# Patient Record
Sex: MALE | Race: WHITE
[De-identification: names, ages, dates, MRNs, and addresses within clinical notes are randomized per-mention and may not be internally consistent; named-entity substitution may affect disease eponyms.]

---

## 2017-01-13 ENCOUNTER — HOSPITAL ENCOUNTER (EMERGENCY)
Dept: HOSPITAL 75 - ER | Age: 52
Discharge: HOME | End: 2017-01-13
Payer: COMMERCIAL

## 2017-01-13 VITALS — DIASTOLIC BLOOD PRESSURE: 89 MMHG | SYSTOLIC BLOOD PRESSURE: 154 MMHG

## 2017-01-13 VITALS — WEIGHT: 235 LBS | BODY MASS INDEX: 34.8 KG/M2 | HEIGHT: 69 IN

## 2017-01-13 DIAGNOSIS — Z79.84: ICD-10-CM

## 2017-01-13 DIAGNOSIS — G89.29: ICD-10-CM

## 2017-01-13 DIAGNOSIS — M25.551: ICD-10-CM

## 2017-01-13 DIAGNOSIS — R10.31: Primary | ICD-10-CM

## 2017-01-13 DIAGNOSIS — E11.9: ICD-10-CM

## 2017-01-13 DIAGNOSIS — F17.210: ICD-10-CM

## 2017-01-13 LAB
ALBUMIN SERPL-MCNC: 3.8 G/DL (ref 3.2–4.5)
ALT SERPL-CCNC: 22 U/L (ref 0–55)
ANION GAP SERPL CALC-SCNC: 11 MMOL/L (ref 5–14)
AST SERPL-CCNC: 13 U/L (ref 5–34)
BASOPHILS # BLD AUTO: 0.1 10^3/UL (ref 0–0.1)
BASOPHILS NFR BLD AUTO: 1 % (ref 0–10)
BILIRUB SERPL-MCNC: 0.3 MG/DL (ref 0.1–1)
BILIRUB UR QL STRIP: NEGATIVE
BUN SERPL-MCNC: 13 MG/DL (ref 7–18)
BUN/CREAT SERPL: 16
CALCIUM SERPL-MCNC: 9.2 MG/DL (ref 8.5–10.1)
CHLORIDE SERPL-SCNC: 100 MMOL/L (ref 98–107)
CO2 SERPL-SCNC: 23 MMOL/L (ref 21–32)
CREAT SERPL-MCNC: 0.82 MG/DL (ref 0.6–1.3)
EOSINOPHIL # BLD AUTO: 0.3 10^3/UL (ref 0–0.3)
EOSINOPHIL NFR BLD AUTO: 2 % (ref 0–10)
ERYTHROCYTE [DISTWIDTH] IN BLOOD BY AUTOMATED COUNT: 11.9 % (ref 10–14.5)
GFR SERPLBLD BASED ON 1.73 SQ M-ARVRAT: > 60 ML/MIN
GLUCOSE SERPL-MCNC: 233 MG/DL (ref 70–105)
KETONES UR QL STRIP: NEGATIVE
LEUKOCYTE ESTERASE UR QL STRIP: NEGATIVE
LYMPHOCYTES # BLD AUTO: 3.1 X 10^3 (ref 1–4)
LYMPHOCYTES NFR BLD AUTO: 27 % (ref 12–44)
MCH RBC QN AUTO: 30 PG (ref 25–34)
MCHC RBC AUTO-ENTMCNC: 35 G/DL (ref 32–36)
MCV RBC AUTO: 88 FL (ref 80–99)
MONOCYTES # BLD AUTO: 0.8 X 10^3 (ref 0–1)
MONOCYTES NFR BLD AUTO: 7 % (ref 0–12)
NEUTROPHILS # BLD AUTO: 7.4 X 10^3 (ref 1.8–7.8)
NEUTROPHILS NFR BLD AUTO: 64 % (ref 42–75)
NITRITE UR QL STRIP: NEGATIVE
PH UR STRIP: 5 [PH] (ref 5–9)
PLATELET # BLD: 278 10^3/UL (ref 130–400)
PMV BLD AUTO: 10.4 FL (ref 7.4–10.4)
POTASSIUM SERPL-SCNC: 4.4 MMOL/L (ref 3.6–5)
PROT SERPL-MCNC: 6.9 G/DL (ref 6.4–8.2)
PROT UR QL STRIP: (no result)
RBC # BLD AUTO: 4.56 10^6/UL (ref 4.35–5.85)
SODIUM SERPL-SCNC: 134 MMOL/L (ref 135–145)
SP GR UR STRIP: 1.01 (ref 1.02–1.02)
SQUAMOUS #/AREA URNS HPF: (no result) /HPF
UROBILINOGEN UR-MCNC: NORMAL MG/DL
WBC # BLD AUTO: 11.6 10^3/UL (ref 4.3–11)
WBC #/AREA URNS HPF: (no result) /HPF

## 2017-01-13 PROCEDURE — 36415 COLL VENOUS BLD VENIPUNCTURE: CPT

## 2017-01-13 PROCEDURE — 74000: CPT

## 2017-01-13 PROCEDURE — 81000 URINALYSIS NONAUTO W/SCOPE: CPT

## 2017-01-13 PROCEDURE — 80053 COMPREHEN METABOLIC PANEL: CPT

## 2017-01-13 PROCEDURE — 85025 COMPLETE CBC W/AUTO DIFF WBC: CPT

## 2017-01-13 NOTE — DIAGNOSTIC IMAGING REPORT
INDICATION: Abdominal pain.



KUB obtained at 10:22 a.m.



The abdominal bowel gas pattern is unremarkable. There is no

sign of obstruction or ileus. There are multiple pelvic

calcifications which may be phleboliths although stones are

difficult to exclude. There is intramedullary queenie and screw in

the right hip with osteophyte formation of the adjacent iliac

bone.



IMPRESSION:



Unremarkable bowel gas pattern. No sign of obstruction or ileus.

There are multiple pelvic calcifications which could be

phleboliths or stones.



Dictated by:



Dictated on workstation # [id number]

## 2017-01-13 NOTE — ED GENERAL
General


Chief Complaint:  General Problems/Pain


Stated Complaint:  PELVIC PAIN


Nursing Triage Note:  


AMBULATED TO ROOM 06 WITH COMPLAINTS OF RIGHT LOWER BACK PAIN THAT RADIATES TO 


RLQ FOR 6 MONTHS.  STATES HE HAS A DISPLACED YANCY IN HIP AND LEG AREA THAT IS 

NON 


SURGICAL.  PT HAS TRAMADOL BUT DID NOT TAKE IT TODAY.


Nursing Sepsis Screen:  No Definite Risk


Source of Information:  Patient


Exam Limitations:  No Limitations





History of Present Illness


Time Seen by Provider:  09:25


Initial Comments


The patient is a 51-year-old white male who complains of right hip, low back, 

and now radiating across the pelvic area.  This is been present for perhaps as 

long as 6 months.  He takes tramadol for this although he did not do so today.  

He was here in November and had a CT scan which was negative.  His hip pain is 

of long standing.  He reports that he had a femur fracture with an intra-

medullary yancy.  The trochanteric component for fixation has apparently slipped.

  He has been told that this is not remediable.





Allergies and Home Medications


Allergies


Uncoded Allergies:  


     THC (Allergy, Unknown, 1/13/17)





Home Medications


Atorvastatin Calcium 40 Mg Tablet #90 (Reported) 


Liraglutide 0.6 Mg/0.1 Ml Pen.injctr #9 (Reported) 


Pregabalin 75 Mg Capsule #120 (Reported) 


Tramadol HCl 50 Mg Tablet #112 (Reported) 





Constitutional:   see HPI


EENTM:   no symptoms reported


Respiratory:   no symptoms reported


Cardiovascular:   no symptoms reported


Gastrointestinal:   constipation


Genitourinary:   no symptoms reported


Musculoskeletal:   see HPI


Skin:   no symptoms reported


Psychiatric/Neurological:   No Symptoms Reported


Hematologic/Lymphatic:   No Symptoms Reported


Immunological/Allergic:   no symptoms reported





Past Medical-Social-Family Hx


Patient Social History


Alcohol Use:  Occasionally Uses


Recreational Drug Use:  No


Smoking Status:  Current Everyday Smoker


Recent Foreign Travel:  No


Contact w/Someone Who Travel:  No


Recent Infectious Disease Expo:  No


Recent Hopitalizations:  No


Physical Abuse Screen:  No


Sexual Abuse:  No





Immunizations Up To Date


Tetanus Booster (TDap):  Less than 5yrs





Surgeries


HX Surgeries:  Yes


Surgeries:  Orthopedic





Respiratory


Hx Respiratory Disorders:  No





Cardiovascular


Hx Cardiac Disorders:  Yes


Cardiac Disorders:  High Cholesterol





Neurological


Hx Neurological Disorders:  No





Genitourinary


Hx Genitourinary Disorders:  No





Gastrointestinal


Hx Gastrointestinal Disorders:  No





Musculoskeletal


Hx Musculoskeletal Disorders:  Yes


Musculoskeletal Disorders:  Chronic Back Pain





Endocrine


Hx Endocrine Disorders:  Yes


Endocrine Disorders:  Diabetes, Non-Insulin dep





HEENT


HX ENT Disorders:  No





Cancer


Hx Cancer:  No





Psychosocial


Hx Psychiatric Problems:  No





Physical Exam


Vital Signs





 Vital Sign - Last 12Hours








 1/13/17





 08:30


 


Temp 98.0


 


Pulse 90


 


Resp 18


 


B/P 161/90


 


Pulse Ox 97





Capillary Refill : Less Than 3 Seconds


General Appearance:   No Apparent Distress WD/WN


Eyes:  Bilateral Eye Normal Inspection


HEENT:   Normal ENT Inspection


Neck:   Full Range of Motion Normal Inspection Non Tender Supple Carotid Bruit


Respiratory:   Chest Non Tender Lungs Clear Normal Breath Sounds No Accessory 

Muscle Use No Respiratory Distress


Cardiovascular:   Regular Rate, Rhythm No Edema No Gallop No JVD No Murmur 

Normal Peripheral Pulses


Gastrointestinal:   Normal Bowel Sounds No Organomegaly No Pulsatile Mass Non 

Tender Soft


Back:   Normal Inspection No CVA Tenderness No Vertebral Tenderness


Neurologic/Psychiatric:   Alert Oriented x3 No Motor/Sensory Deficits Normal 

Mood/Affect


Skin:   Normal Color Warm/Dry


Lymphatic:   No Adenopathy





Progress/Results/Core Measures


Results/Orders


Lab Results





 Laboratory Tests








Test


  1/13/17


08:50 1/13/17


08:55 Range/Units


 


 


Alanine Aminotransferase


(ALT/SGPT) 22 


  


  0-55  U/L


 


 


Albumin 3.8   3.2-4.5  G/DL


 


Alkaline Phosphatase 89     U/L


 


Anion Gap 11   5-14  MMOL/L


 


Aspartate Amino Transf


(AST/SGOT) 13 


  


  5-34  U/L


 


 


BUN/Creatinine Ratio 16    


 


Basophils # (Auto)


  0.1 


  


  0.0-0.1


10^3/uL


 


Basophils (%) (Auto) 1   0-10  %


 


Blood Urea Nitrogen 13   7-18  MG/DL


 


Calcium Level 9.2   8.5-10.1  MG/DL


 


Carbon Dioxide Level 23   21-32  MMOL/L


 


Chloride Level 100     MMOL/L


 


Creatinine


  0.82 


  


  0.60-1.30


MG/DL


 


Eosinophils # (Auto)


  0.3 


  


  0.0-0.3


10^3/uL


 


Eosinophils (%) (Auto) 2   0-10  %


 


Estimat Glomerular Filtration


Rate > 60 


  


   


 


 


Glucose Level 233 H    MG/DL


 


Hematocrit 40   40-54  %


 


Hemoglobin 13.8   13.3-17.7  G/DL


 


Lymphocytes # (Auto) 3.1   1.0-4.0  X 10^3


 


Lymphocytes (%) (Auto) 27   12-44  %


 


Mean Corpuscular Hemoglobin 30   25-34  PG


 


Mean Corpuscular Hemoglobin


Concent 35 


  


  32-36  G/DL


 


 


Mean Corpuscular Volume 88   80-99  FL


 


Mean Platelet Volume 10.4   7.4-10.4  FL


 


Monocytes # (Auto) 0.8   0.0-1.0  X 10^3


 


Monocytes (%) (Auto) 7   0-12  %


 


Neutrophils # (Auto) 7.4   1.8-7.8  X 10^3


 


Neutrophils (%) (Auto) 64   42-75  %


 


Platelet Count


  278 


  


  130-400


10^3/uL


 


Potassium Level 4.4   3.6-5.0  MMOL/L


 


Red Blood Count


  4.56 


  


  4.35-5.85


10^6/uL


 


Red Cell Distribution Width 11.9   10.0-14.5  %


 


Sodium Level 134 L  135-145  MMOL/L


 


Total Bilirubin 0.3   0.1-1.0  MG/DL


 


Total Protein 6.9   6.4-8.2  G/DL


 


White Blood Count


  11.6 H


  


  4.3-11.0


10^3/uL


 


Urine Bacteria  NEGATIVE   /HPF


 


Urine Bilirubin  NEGATIVE  NEGATIVE  


 


Urine Casts  NONE   /LPF


 


Urine Clarity  CLEAR   


 


Urine Color  YELLOW   


 


Urine Crystals  NONE   /LPF


 


Urine Culture Indicated  NO   


 


Urine Glucose (UA)  4+ H NEGATIVE  


 


Urine Ketones  NEGATIVE  NEGATIVE  


 


Urine Leukocyte Esterase  NEGATIVE  NEGATIVE  


 


Urine Mucus  NEGATIVE   /LPF


 


Urine Nitrite  NEGATIVE  NEGATIVE  


 


Urine Protein  2+ H NEGATIVE  


 


Urine RBC  NONE   /HPF


 


Urine RBC (Auto)  NEGATIVE  NEGATIVE  


 


Urine Specific Gravity  1.015 L 1.016-1.022  


 


Urine Squamous Epithelial


Cells 


  NONE 


   /HPF


 


 


Urine Urobilinogen  NORMAL  NORMAL  MG/DL


 


Urine WBC  NONE   /HPF


 


Urine pH  5  5-9  








My Orders





 Orders-ASHELY PARDO MD


Cbc With Automated Diff (1/13/17 08:40)


Comprehensive Metabolic Panel (1/13/17 08:40)


Ua Culture If Indicated (1/13/17 08:40)


Abdomen/Kub 1view (1/13/17 09:59)





Vital Signs/I&O





 Vital Sign - Last 12Hours








 1/13/17





 08:30


 


Temp 98.0


 


Pulse 90


 


Resp 18


 


B/P 161/90


 


Pulse Ox 97








Blood Pressure Mean:  113





Departure


Impression


Impression:  


 Primary Impression:  


 Right groin pain


Disposition:  01 HOME, SELF-CARE


Condition:  Stable/Unchanged





Departure-Patient Inst.


Decision time for Depature:  11:11


Referrals:  


[facility] (PCP/Family)


Primary Care Physician


Patient Instructions:  CHRONIC PAIN





Add. Discharge Instructions:


All discharge instructions reviewed with patient and/or family. Voiced 

understanding.


Continue tramadol as prescribed


MiraLAX 17 g daily or every other day for bowel movements


See your provider to consider alternatives including the possibility of an 

outpatient pain consult.








ASHELY PARDO MD Jan 13, 2017 10:54

## 2019-01-14 ENCOUNTER — HOSPITAL ENCOUNTER (EMERGENCY)
Dept: HOSPITAL 75 - ER | Age: 54
Discharge: HOME | End: 2019-01-14
Payer: COMMERCIAL

## 2019-01-14 VITALS — HEIGHT: 70 IN | WEIGHT: 230 LBS | BODY MASS INDEX: 32.93 KG/M2

## 2019-01-14 VITALS — DIASTOLIC BLOOD PRESSURE: 86 MMHG | SYSTOLIC BLOOD PRESSURE: 152 MMHG

## 2019-01-14 DIAGNOSIS — Z98.890: ICD-10-CM

## 2019-01-14 DIAGNOSIS — E78.00: ICD-10-CM

## 2019-01-14 DIAGNOSIS — L03.115: Primary | ICD-10-CM

## 2019-01-14 DIAGNOSIS — F17.210: ICD-10-CM

## 2019-01-14 DIAGNOSIS — F12.10: ICD-10-CM

## 2019-01-14 DIAGNOSIS — E11.65: ICD-10-CM

## 2019-01-14 LAB
ALBUMIN SERPL-MCNC: 3.9 GM/DL (ref 3.2–4.5)
ALP SERPL-CCNC: 140 U/L (ref 40–136)
ALT SERPL-CCNC: 14 U/L (ref 0–55)
BASOPHILS # BLD AUTO: 0.1 10^3/UL (ref 0–0.1)
BASOPHILS NFR BLD AUTO: 0 % (ref 0–10)
BASOPHILS NFR BLD MANUAL: 3 %
BILIRUB SERPL-MCNC: 0.3 MG/DL (ref 0.1–1)
BUN/CREAT SERPL: 13
CALCIUM SERPL-MCNC: 9.8 MG/DL (ref 8.5–10.1)
CHLORIDE SERPL-SCNC: 92 MMOL/L (ref 98–107)
CO2 SERPL-SCNC: 22 MMOL/L (ref 21–32)
CREAT SERPL-MCNC: 1.12 MG/DL (ref 0.6–1.3)
EOSINOPHIL # BLD AUTO: 0.1 10^3/UL (ref 0–0.3)
EOSINOPHIL NFR BLD AUTO: 1 % (ref 0–10)
ERYTHROCYTE [DISTWIDTH] IN BLOOD BY AUTOMATED COUNT: 11.9 % (ref 10–14.5)
GFR SERPLBLD BASED ON 1.73 SQ M-ARVRAT: > 60 ML/MIN
GLUCOSE SERPL-MCNC: 682 MG/DL (ref 70–105)
HCT VFR BLD CALC: 42 % (ref 40–54)
HGB BLD-MCNC: 14.4 G/DL (ref 13.3–17.7)
LYMPHOCYTES # BLD AUTO: 1.7 X 10^3 (ref 1–4)
LYMPHOCYTES NFR BLD AUTO: 12 % (ref 12–44)
MANUAL DIFFERENTIAL PERFORMED BLD QL: YES
MCH RBC QN AUTO: 29 PG (ref 25–34)
MCHC RBC AUTO-ENTMCNC: 34 G/DL (ref 32–36)
MCV RBC AUTO: 84 FL (ref 80–99)
MONOCYTES # BLD AUTO: 0.9 X 10^3 (ref 0–1)
MONOCYTES NFR BLD AUTO: 6 % (ref 0–12)
MONOCYTES NFR BLD: 7 %
NEUTROPHILS # BLD AUTO: 12 X 10^3 (ref 1.8–7.8)
NEUTROPHILS NFR BLD AUTO: 82 % (ref 42–75)
NEUTS BAND NFR BLD MANUAL: 82 %
PLATELET # BLD: 348 10^3/UL (ref 130–400)
PMV BLD AUTO: 10.8 FL (ref 7.4–10.4)
POTASSIUM SERPL-SCNC: 4.7 MMOL/L (ref 3.6–5)
PROT SERPL-MCNC: 7.8 GM/DL (ref 6.4–8.2)
RBC # BLD AUTO: 4.98 10^6/UL (ref 4.35–5.85)
RBC MORPH BLD: NORMAL
SODIUM SERPL-SCNC: 127 MMOL/L (ref 135–145)
VARIANT LYMPHS NFR BLD MANUAL: 8 %
WBC # BLD AUTO: 14.7 10^3/UL (ref 4.3–11)

## 2019-01-14 PROCEDURE — 82962 GLUCOSE BLOOD TEST: CPT

## 2019-01-14 PROCEDURE — 36415 COLL VENOUS BLD VENIPUNCTURE: CPT

## 2019-01-14 PROCEDURE — 85007 BL SMEAR W/DIFF WBC COUNT: CPT

## 2019-01-14 PROCEDURE — 80053 COMPREHEN METABOLIC PANEL: CPT

## 2019-01-14 PROCEDURE — 87186 SC STD MICRODIL/AGAR DIL: CPT

## 2019-01-14 PROCEDURE — 87077 CULTURE AEROBIC IDENTIFY: CPT

## 2019-01-14 PROCEDURE — 87070 CULTURE OTHR SPECIMN AEROBIC: CPT

## 2019-01-14 PROCEDURE — 87205 SMEAR GRAM STAIN: CPT

## 2019-01-14 PROCEDURE — 85027 COMPLETE CBC AUTOMATED: CPT

## 2019-01-14 NOTE — ED INTEGUMENTARY GENERAL
General


Chief Complaint:  Skin/Wound Problems


Stated Complaint:  DIABETIC/R LEG INFECTION


Nursing Triage Note:  


Ambulatory to rm 5.  Pt c/o wound and redness to R knee.  Pt reports tripped in 


a flower bed and falling on knee, and then crawling under a house four days 

ago. 


 Pt reports the next morning the knee was red and sore.  Pt c/o lymph node 


swelling in the L groin.  Pt's R knee has a scab and redness to the surrounding 


area.  Pt reports pain when walking on leg.


Source:  patient


Exam Limitations:  no limitations





History of Present Illness


Date Seen by Provider:  Jan 14, 2019


Time Seen by Provider:  17:19


Initial Comments


To ER with reports of a four-day history of a wound to the right anterior knee. 

He states that he fell into a flower bed landing on the knee. He was then 

crawling around beneath the house 4 days ago. The next morning he awakened with 

redness and swelling to the knee. He denies fevers or chills. He is diabetic 

state that he is very bad about checking his blood sugars.


Timing/Duration:  constant


Severity:  moderate


Location:  extremities





Allergies and Home Medications


Allergies


Uncoded Allergies:  


     THC (Allergy, Unknown, 1/13/17)





Patient Home Medication List


Home Medication List Reviewed:  Yes





Review of Systems


Review of Systems


Constitutional:  see HPI


EENTM:  see HPI


Respiratory:  no symptoms reported


Cardiovascular:  no symptoms reported


Genitourinary:  no symptoms reported


Musculoskeletal:  no symptoms reported


Skin:  see HPI


Psychiatric/Neurological:  No Symptoms Reported


Endocrine:  No Symptoms Reported


Hematologic/Lymphatic:  No Symptoms Reported





Past Medical-Social-Family Hx


Patient Social History


Alcohol Use:  Occasionally Uses


Number of Drinks Today:  AA


Alcohol Beverage of Choice:  Beer


Recreational Drug Use:  No


Smoking Status:  Current Someday Smoker


Type Used:  Cigarettes


Recent Foreign Travel:  No


Contact w/Someone Who Travel:  No


Recent Infectious Disease Expo:  No


Recent Hopitalizations:  No


Physical Abuse:  No


Sexual Abuse:  No





Immunizations Up To Date


Tetanus Booster (TDap):  Less than 5yrs





Past Medical History


Surgeries:  Yes


Orthopedic


Respiratory:  No


Cardiac:  Yes


High Cholesterol


Neurological:  No


Gastrointestinal:  No


Musculoskeletal:  Yes


Chronic Back Pain


Endocrine:  Yes


Diabetes, Non-Insulin dep


Cancer:  No


Psychosocial:  No





Physical Exam


Vital Signs





Vital Signs - First Documented








 1/14/19





 16:40


 


Temp 97.8


 


Pulse 86


 


Resp 15


 


B/P (MAP) 133/113 (120)


 


Pulse Ox 98


 


O2 Delivery Room Air





Capillary Refill : Less Than 3 Seconds


General Appearance:  WD/WN, no apparent distress


HEENT:  PERRL/EOMI, normal ENT inspection


Neck:  non-tender


Respiratory:  no respiratory distress, no accessory muscle use


Neurologic/Psychiatric:  alert, normal mood/affect, oriented x 3


Skin:  normal color, warm/dry, other (7 cm of erythema to the right anterior 

knee with a small pustule in the center. This was unroofed with a blunt needle, 

culture collected and sent to lab. No lymphangitis. There is some very tender, 

palpable right inguinal lymphadenopathy.)





Progress/Results/Core Measures


Results/Orders


Lab Results





Laboratory Tests








Test


 1/14/19


16:50 1/14/19


18:44 1/14/19


19:44 Range/Units


 


 


White Blood Count


 14.7 H


 


 


 4.3-11.0


10^3/uL


 


Red Blood Count


 4.98 


 


 


 4.35-5.85


10^6/uL


 


Hemoglobin 14.4    13.3-17.7  G/DL


 


Hematocrit 42    40-54  %


 


Mean Corpuscular Volume 84    80-99  FL


 


Mean Corpuscular Hemoglobin 29    25-34  PG


 


Mean Corpuscular Hemoglobin


Concent 34 


 


 


 32-36  G/DL





 


Red Cell Distribution Width 11.9    10.0-14.5  %


 


Platelet Count


 348 


 


 


 130-400


10^3/uL


 


Mean Platelet Volume 10.8 H   7.4-10.4  FL


 


Neutrophils (%) (Auto) 82 H   42-75  %


 


Lymphocytes (%) (Auto) 12    12-44  %


 


Monocytes (%) (Auto) 6    0-12  %


 


Eosinophils (%) (Auto) 1    0-10  %


 


Basophils (%) (Auto) 0    0-10  %


 


Neutrophils # (Auto) 12.0 H   1.8-7.8  X 10^3


 


Lymphocytes # (Auto) 1.7    1.0-4.0  X 10^3


 


Monocytes # (Auto) 0.9    0.0-1.0  X 10^3


 


Eosinophils # (Auto)


 0.1 


 


 


 0.0-0.3


10^3/uL


 


Basophils # (Auto)


 0.1 


 


 


 0.0-0.1


10^3/uL


 


Neutrophils % (Manual) 82     %


 


Lymphocytes % (Manual) 8     %


 


Monocytes % (Manual) 7     %


 


Basophils % (Manual) 3     %


 


Blood Morphology Comment NORMAL     


 


Sodium Level 127 L   135-145  MMOL/L


 


Potassium Level 4.7    3.6-5.0  MMOL/L


 


Chloride Level 92 L     MMOL/L


 


Carbon Dioxide Level 22    21-32  MMOL/L


 


Anion Gap 13    5-14  MMOL/L


 


Blood Urea Nitrogen 15    7-18  MG/DL


 


Creatinine


 1.12 


 


 


 0.60-1.30


MG/DL


 


Estimat Glomerular Filtration


Rate > 60 


 


 


  





 


BUN/Creatinine Ratio 13     


 


Glucose Level 682 *H     MG/DL


 


Calcium Level 9.8    8.5-10.1  MG/DL


 


Corrected Calcium 9.9    8.5-10.1  MG/DL


 


Total Bilirubin 0.3    0.1-1.0  MG/DL


 


Aspartate Amino Transf


(AST/SGOT) 10 


 


 


 5-34  U/L





 


Alanine Aminotransferase


(ALT/SGPT) 14 


 


 


 0-55  U/L





 


Alkaline Phosphatase 140 H     U/L


 


Total Protein 7.8    6.4-8.2  GM/DL


 


Albumin 3.9    3.2-4.5  GM/DL


 


Glucometer  489 *H 340 H   MG/DL








My Orders





Orders - JEWELS GARCIA APRN


Cbc With Automated Diff (1/14/19 17:17)


Comprehensive Metabolic Panel (1/14/19 17:17)


Iv Heplock-Insert (Order) (1/14/19 17:17)


Wound Culture (1/14/19 17:17)


Ketorolac Injection (Toradol Injection) (1/14/19 17:30)


Ceftriaxone  For Iv Use (Rocephin  For I (1/14/19 17:30)


Sulfamethoxazole/Trimet Ds Tab (Bactrim (1/14/19 17:30)


Lactated Ringers (Lr 1000 Ml Iv Solution (1/14/19 17:30)


Manual Differential (1/14/19 16:50)


Ns Iv 1000 Ml (Sodium Chloride 0.9%) (1/14/19 17:45)


Insulin (Regular) Human (Humulin R (Per (1/14/19 17:45)


Insulin Determir (Per Unit) (Levemir (Pe (1/14/19 18:00)


Accucheck Stat ONCE (1/14/19 18:35)


Ns Iv 1000 Ml (Sodium Chloride 0.9%) (1/14/19 19:00)


Insulin (Regular) Human (Humulin R (Per (1/14/19 21:00)


Accucheck Stat ONCE (1/14/19 19:33)


Accucheck Stat ONCE (1/14/19 20:15)





Medications Given in ED





Current Medications








 Medications  Dose


 Ordered  Sig/Blanca


 Route  Start Time


 Stop Time Status Last Admin


Dose Admin


 


 Ceftriaxone


 Sodium 1000 mg/


 Sodium Chloride  50 ml @ 


 100 mls/hr  ONCE  ONCE


 IV  1/14/19 17:30


 1/14/19 17:59 DC 1/14/19 17:46


100 MLS/HR


 


 Insulin Detemir  10 unit  ONCE  ONCE


 SQ  1/14/19 18:00


 1/14/19 18:01 DC 1/14/19 18:00


10 UNIT


 


 Insulin Human


 Regular  10 unit  ONCE  ONCE


 IV  1/14/19 17:45


 1/14/19 17:46 DC 1/14/19 18:00


10 UNIT


 


 Ketorolac


 Tromethamine  30 mg  ONCE  ONCE


 IVP  1/14/19 17:30


 1/14/19 17:31 DC 1/14/19 17:47


30 MG


 


 Trimethoprim/


 Sulfamethoxazole  2 ea  ONCE  ONCE


 PO  1/14/19 17:30


 1/14/19 17:31 DC 1/14/19 17:47


2 EA








Vital Signs/I&O











 1/14/19





 16:40


 


Temp 97.8


 


Pulse 86


 


Resp 15


 


B/P (MAP) 133/113 (120)


 


Pulse Ox 98


 


O2 Delivery Room Air














Blood Pressure Mean:  120











Departure


Impression





 Primary Impression:  


 Cellulitis


 Qualified Codes:  L03.115 - Cellulitis of right lower limb


 Additional Impression:  


 Hyperglycemia


Disposition:  01 HOME, SELF-CARE


Condition:  Stable





Departure-Patient Inst.


Decision time for Depature:  20:31


Referrals:  


[facility] (PCP)


Primary Care Physician








[facility] (Family)


Primary Care Physician


Patient Instructions:  Cellulitis (Skin Infection), Adult (DC)





Add. Discharge Instructions:  


1. Take antibiotics as directed starting tomorrow. Return to ER for any 

concerns. Is very important that you keep better control of your blood sugars 

checking them at least 3 times a day. Call your regular doctor tomorrow for 

follow-up appointment this week. All discharge instructions reviewed with 

patient and/or family. Voiced understanding.


Scripts


Cephalexin (Keflex) 500 Mg Capsule


500 MG PO TID, #21 CAP


   Prov: JEWELS GARCIA         1/14/19 


Sulfamethoxazole/Trimethoprim (Bactrim Ds Tablet) 1 Each Tablet


1 EACH PO BID, #14 TAB


   Prov: JEWELS GARCIA         1/14/19











JEWELS GARCIA Jan 14, 2019 17:21

## 2020-05-31 ENCOUNTER — HOSPITAL ENCOUNTER (EMERGENCY)
Dept: HOSPITAL 75 - ER | Age: 55
Discharge: LEFT BEFORE DISCHARGE | End: 2020-05-31
Payer: SELF-PAY

## 2020-05-31 VITALS — WEIGHT: 178.57 LBS | BODY MASS INDEX: 26.45 KG/M2 | HEIGHT: 68.9 IN

## 2020-05-31 VITALS — SYSTOLIC BLOOD PRESSURE: 134 MMHG | DIASTOLIC BLOOD PRESSURE: 70 MMHG

## 2020-05-31 DIAGNOSIS — M25.521: Primary | ICD-10-CM
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
## 2020-05-31 PROCEDURE — 99282 EMERGENCY DEPT VISIT SF MDM: CPT
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
## 2020-05-31 NOTE — XMS REPORT
[facility]

                             Created on: 2016



Rl Barraza

External Reference #: [id number]

: 1965

Sex: Male



Demographics





                          Address                   [address] [place]  [address]

 

                          Home Phone                [phone]

 

                          Preferred Language        Unknown

 

                          Marital Status            Unknown

 

                          Hinduism Affiliation     Unknown

 

                          Race                      White

 

                          Ethnic Group              Not  or 





Author





                          Author                    Rl VICTOR

 

                          [facility]              [employer]

 

                          Address                   Unknown

 

                          Phone                     Unavailable







Care Team Providers





                    Care Team Member Name Role                Phone

 

                    FRANCESCA VICTOR    CP                  Unavailable



                                                                



Allergies

          No Known Allergies                                                    
                                   



Problems

          



             Problem Type Condition    Code         Onset Dates  Condition Statu

s

 

                          Problem                   Type 2 diabetes mellitus wit

h complication, without long-term current 

use of insulin      E11.8                                   Active

 

             Problem      Neuropathy   G62.9                     Active

 

             Problem      Hypertriglyceridemia E78.1                     Active

 

             Assessment   Hypertriglyceridemia E78.1                     Active



                                                                                
                                     



Medications

          



        Medication Code System Code    Instructions Start Date End Date Status  

Dosage

 

        Lipitor NDC     [address]-32 40 mg Orally Once a day 2016      

           1 tablet



                                                                              



Results

          No Known Results                                                      
             



Summary Purpose

          [employer] Submission

## 2020-05-31 NOTE — XMS REPORT
[facility]

                             Created on: 2017



Rl Barraza

External Reference #: [id number]

: 1965

Sex: Male



Demographics





                          Address                   [address] [place]  [address]

 

                          Preferred Language        Unknown

 

                          Marital Status            Unknown

 

                          Cheondoism Affiliation     Unknown

 

                          Race                      Unknown

 

                          Ethnic Group              Unknown





Author





                          Author                    Rl VICTOR

 

                          [facility]

 

                          Address                   [address] [place]  [address]



 

                          Phone                     [phone]







Care Team Providers





                    Care Team Member Name Role                Phone

 

                    FRANCESCA VICTOR    Unavailable         [phone]







PROBLEMS





          Type      Condition ICD9-CM Code GTP11-OD Code Onset Dates Condition S

tatus SNOMED 

Code

 

          Problem   Right hip pain           M25.551             Active    48174

[id number]

 

          Problem   Hypertriglyceridemia           E78.1               Active   

 [id number]

 

          Problem   Neuropathy           G62.9               Active    [id number]

 

                          Problem                   Type 2 diabetes mellitus wit

h complication, without long-term current 

use of insulin              E11.8                     Active       [id number]







ALLERGIES

Unknown Allergies



SOCIAL HISTORY

No smoking Hx information available



PLAN OF CARE





VITAL SIGNS





MEDICATIONS





        Medication Instructions Dosage  Frequency Start Date End Date Duration S

tatus

 

                    Ultram 50 MG        Orally 3 times a day while working 2 tab

lets twice daily on days 

not working                             28 days      Active







RESULTS

No Results



PROCEDURES

No Known procedures



IMMUNIZATIONS

No Known Immunizations
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
## 2020-05-31 NOTE — XMS REPORT
[facility]

                             Created on: 2017



Rl Barraza

External Reference #: [id number]

: 1965

Sex: Male



Demographics





                          Address                   [address] [place]  [address]

 

                          Preferred Language        Unknown

 

                          Marital Status            Unknown

 

                          Christianity Affiliation     Unknown

 

                          Race                      Unknown

 

                          Ethnic Group              Unknown





Author





                          Author                    Rl VICTOR

 

                          [facility]

 

                          Address                   [address] [place]  [address]



 

                          Phone                     [phone]







Care Team Providers





                    Care Team Member Name Role                Phone

 

                    FRANCESCA VICTOR    Unavailable         [phone]







PROBLEMS





          Type      Condition ICD9-CM Code CWC74-DQ Code Onset Dates Condition S

tatus SNOMED 

Code

 

          Problem   Hypertriglyceridemia           E78.1               Active   

 [id number]

 

                          Problem                   Type 2 diabetes mellitus wit

h complication, without long-term current 

use of insulin              E11.8                     Active       [id number]

 

          Assessment Right hip pain           M25.551    Active    3

[id number]

 

          Problem   Neuropathy           G62.9               Active    [id number]

 

                          Assessment                Type 2 diabetes mellitus wit

h complication, without long-term current

use of insulin              E11.8         Active       [id number]







ALLERGIES





             Substance    Reaction     Event Type   Date         Status

 

             N.K.D.A.     Unknown      Non Drug Allergy  Unknown







SOCIAL HISTORY

No smoking Hx information available



PLAN OF CARE





VITAL SIGNS





                    Height              69.75 in            2016

 

                    Weight              236.1 lbs           2016

 

                    Heart Rate          100 bpm             2016

 

                    Respiratory Rate    20                  2016

 

                    BMI                 34.12 kg/m2         2016

 

                    Blood pressure systolic 151 mmHg            2016

 

                    Blood pressure diastolic 89 mmHg             2016







MEDICATIONS





        Medication Instructions Dosage  Frequency Start Date End Date Duration S

tatus

 

                    Victoza 18 MG/3ML   Subcutaneous Once a day .6mg daily X 1 w

[place] then 1.2mg daily X

1 week then 1.8mg daily 24h          17 Oct, 2016                           Acti

ve

 

                          MetFORMIN HCl  MG   Orally Once a day X 4 days, 

then 1 tab bid X 4 days then

2 in PM and in 1 AM x 4 days then 2 twice a day 1 tablet with evening meal      

               17 

Oct, 2016                                                   Active

 

        Tramadol HCl 50 mg Orally 2 times a day 1 tablet as needed 12h     21 2016                 

Active

 

        Lyrica 75 MG Orally 2 times a day 2 capsules 12h     17 Oct, 2016       

  30 days Active

 

        Lipitor 40 mg Orally Once a day 1 tablet 24h              90

 days Active







RESULTS





                Name            Result          Date            Reference Range

 

                URINE DRUG SCREEN (IN HOUSE)                 2016       

 

                Lot #           [id number]                          

 

                Exp date                                  

 

                Control         +                                

 

                COCAINE         Negative                         

 

                AMPH            Negative                         

 

                MTD             Negative                         

 

                THC             Negative                         

 

                OPIATE          Negative                         

 

                BENZO           Negative                         

 

                PCP             Negative                         

 

                BAR             Negative                         

 

                OXY             Negative                         

 

                MAMP            Negative                         

 

                TCA             Negative                         

 

                BUP                                              

 

                MDMA            Negative                         

 

                MICROALBUMIN/CREATININE RATIO, URINE                 2016 

      

 

                Creatinine, Urine 106.5                           Not Estab.

 

                Microalbumin, Urine 234.0                           Not Estab.

 

                Microalb/Creat Ratio 219.7                           0.0-30.0

 

                MICROALBUMIN, URINE (IN HOUSE)                 2016       

 

                MICROALBUMIN    ABNORMAL                         

 

                Lot #           [id number]                           

 

                Exp date                                  

 

                Clarity         Clear                            

 

                Color           Yellow                           

 

                ALB             150                              

 

                CRE             200                              

 

                A:C (IN HOUSE)                             

 

                Control         +                                

 

                Control         -                                

 

                Lot #           [id number]                           

 

                Exp date                                  

 

                UA LONG DIP (IN HOUSE)                 2016       

 

                Lot #           [id number]                           

 

                Exp date                                  

 

                Clarity         Clear                            

 

                Color           Yellow                           

 

                Odor            Non                              

 

                GLU             1+                               

 

                CRISTOFER             Negative                         

 

                KET             Trace                            

 

                SG              >=1.030                          

 

                BLO             Trace-Intact                     

 

                pH              5.5                              

 

                Protein         2+                               

 

                URO             0.2                              

 

                NIT             Negative                         

 

                SHANNON             Negative                         

 

                Lot #           [id number]                           

 

                Exp date                                  







PROCEDURES





                Procedure       Date Ordered    Related Diagnosis Body Site

 

                MICROALBUMIN, SEMIQUANT 2016                     

 

                URINALYSIS, AUTO, W/O SCOPE 2016                     

 

                MICROALBUMIN, QUANTITATIVE 2016                     

 

                ASSAY OF URINE CREATININE 2016                     

 

                Office Visit, Est Pt., Level 3 2016                     

 

                DRUG SCREEN NON TLC DEVICES 2016                     







IMMUNIZATIONS

No Known Immunizations
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
## 2020-05-31 NOTE — XMS REPORT
[facility]

                             Created on: 10/31/2016



Rl Barraza

External Reference #: [id number]

: 1965

Sex: Male



Demographics





                          Address                   [address] [place]  [address]

 

                          Home Phone                [phone]

 

                          Preferred Language        Unknown

 

                          Marital Status            Unknown

 

                          [place] Affiliation     Unknown

 

                          Race                      White

 

                          Ethnic Group              Not  or 





Author





                          Author                    Rl VICTOR

 

                          [facility]              [employer]

 

                          Address                   Unknown

 

                          Phone                     Unavailable







Care Team Providers





                    Care Team Member Name Role                Phone

 

                    FRANCESCA VICTOR    CP                  Unavailable



                                                                



Allergies

          No Known Allergies                                                    
                                   



Problems

          



             Problem Type Condition    Code         Onset Dates  Condition Statu

s

 

             Problem      Neuropathy   G62.9                     Active

 

                          Assessment                Type 2 diabetes mellitus wit

h complication, without long-term current

use of insulin      E11.8                                   Active

 

                          Problem                   Type 2 diabetes mellitus wit

h complication, without long-term current 

use of insulin      E11.8                                   Active

 

             Assessment   Pain of left foot M79.672                   Active

 

             Assessment   Pain in right foot M79.671                   Active

 

             Assessment   Right hip pain M25.551                   Active

 

             Assessment   Neuropathy   G62.9                     Active



                                                                                
                                                                   



Medications

          No Known Medications                                                  
                                     



Procedures

          



                Procedure       Coding System   Code            Date

 

                X-RAY EXAM OF FOOT CPT-4           97078           Oct 31, 2016

 

                X-RAY EXAM HIP UNI 2-3 VIEWS CPT-4           77639           Oct

 31, 2016



                                                                                
       



Results

          No Known Results                                                      
             



Summary Purpose

          [employer] Submission

## 2020-05-31 NOTE — XMS REPORT
[facility]

                             Created on: 2017



Rl Barraza

External Reference #: [id number]

: 1965

Sex: Male



Demographics





                          Address                   [address] [place]  [address]

 

                          Preferred Language        Unknown

 

                          Marital Status            Unknown

 

                          [place] Affiliation     Unknown

 

                          Race                      Unknown

 

                          Ethnic Group              Unknown





Author





                          Author                    Rl VICTOR

 

                          Organization              [facility]

 

                          Address                   [address] [place]  [address]



 

                          Phone                     [phone]







Care Team Providers





                    Care Team Member Name Role                Phone

 

                    FRANCESCA VICTOR    Unavailable         [phone]







PROBLEMS





          Type      Condition ICD9-CM Code VJC26-OW Code Onset Dates Condition S

tatus SNOMED 

Code

 

          Problem   Right hip pain           M25.551             Active    74596

[id number]

 

          Problem   Hypertriglyceridemia           E78.1               Active   

 [id number]

 

                          Problem                   Type 2 diabetes mellitus wit

h complication, without long-term current 

use of insulin              E11.8                     Active       [id number]

 

          Problem   Neuropathy           G62.9               Active    [id number]







ALLERGIES





             Substance    Reaction     Event Type   Date         Status

 

             N.K.D.A.     Unknown      Non Drug Allergy 13 Dec, 2016 Unknown







SOCIAL HISTORY

No smoking Hx information available



PLAN OF CARE





                          Activity                  Details

 

                                         

 

                          Follow Up                 prn Reason:







VITAL SIGNS





                    Height              69.75 in            2016

 

                    Weight              240 lbs             2016

 

                    Temperature         98.1 degrees Fahrenheit 2016

 

                    Heart Rate          80 bpm              2016

 

                    Respiratory Rate    20                  2016

 

                    BMI                 34.68 kg/m2         2016

 

                    Blood pressure systolic 120 mmHg            2016

 

                    Blood pressure diastolic 90 mmHg             2016







MEDICATIONS





        Medication Instructions Dosage  Frequency Start Date End Date Duration S

tatus

 

        MetFORMIN HCl  MG Orally 2 times a day 2 capsules 12h     17 Oct, 

2016                 

Active

 

           Tramadol HCl 50 mg Orally 2 times a day for hip pain 2 tablet        

      21 Nov, 2016 10 

Ruddy, 2017                 28 days                   Active

 

        Lipitor 40 mg Orally Once a day 1 tablet 24h              90

 days Active

 

        Lyrica 75 MG Orally 2 times a day 2 capsules 12h     17 Oct, 2016       

  30 days Active

 

        Victoza 18 MG/3ML Subcutaneous Once a day 1.8 Units 24h     17 Oct, 2016

                 Active







RESULTS

No Results



PROCEDURES





                Procedure       Date Ordered    Related Diagnosis Body Site

 

                Office Visit, Est Pt., Level 3 Dec 13, 2016                     







IMMUNIZATIONS

No Known Immunizations

## 2020-05-31 NOTE — XMS REPORT
[facility]

                             Created on: 2016



Rl Barraza

External Reference #: [id number]

: 1965

Sex: Male



Demographics





                          Address                   [address] [place]  [address]

 

                          Home Phone                [phone]

 

                          Preferred Language        Unknown

 

                          Marital Status            Unknown

 

                          Mandaen Affiliation     Unknown

 

                          Race                      White

 

                          Ethnic Group              Not  or 





Author





                          Author                    Rl VICTOR

 

                          Organization              [employer]

 

                          Address                   Unknown

 

                          Phone                     Unavailable







Care Team Providers





                    Care Team Member Name Role                Phone

 

                    FRANCESCA VICTOR    CP                  Unavailable



                                                                



Allergies

          No Known Allergies                                                    
                                   



Problems

          



             Problem Type Condition    Code         Onset Dates  Condition Statu

s

 

                          Problem                   Type 2 diabetes mellitus wit

h complication, without long-term current 

use of insulin      E11.8                                   Active

 

             Problem      Neuropathy   G62.9                     Active

 

             Problem      Hypertriglyceridemia E78.1                     Active

 

             Assessment   Neuropathy   G62.9                     Active



                                                                                
                                     



Medications

          



        Medication Code System Code    Instructions Start Date End Date Status  

Dosage

 

        Lyrica  NDC     [address]-53 75 MG Orally 2 times a day Oct 17, 2016   

              2 capsules



                                                                              



Results

          No Known Results                                                      
             



Summary Purpose

          [employer] Submission
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
## 2020-05-31 NOTE — XMS REPORT
[facility]

                             Created on: 2016



Rl Barraza

External Reference #: [id number]

: 1965

Sex: Male



Demographics





                          Address                   [address] [place]  [address]

 

                          Home Phone                [phone]

 

                          Preferred Language        Unknown

 

                          Marital Status            Unknown

 

                          Mormonism Affiliation     Unknown

 

                          Race                      White

 

                          Ethnic Group              Not  or 





Author





                          Author                    Rl VICTOR

 

                          [facility]              [employer]

 

                          Address                   Unknown

 

                          Phone                     Unavailable







Care Team Providers





                    Care Team Member Name Role                Phone

 

                    FRANCESCA VICTOR    CP                  Unavailable



                                                                



Allergies, Adverse Reactions, Alerts

          



                    Substance           Reaction            Event Type

 

                    N.K.D.A.            Info Not Available  Non Drug Allergy



                                                                                
       



Problems

          



             Problem Type Condition    Code         Onset Dates  Condition Statu

s

 

             Problem      Neuropathy   G62.9                     Active

 

                          Assessment                Type 2 diabetes mellitus wit

h complication, without long-term current

use of insulin      E11.8                                   Active

 

                          Problem                   Type 2 diabetes mellitus wit

h complication, without long-term current 

use of insulin      E11.8                                   Active

 

             Assessment   Pain of left foot M79.672                   Active

 

             Assessment   Pain in right foot M79.671                   Active

 

             Assessment   Neuropathy   G62.9                     Active

 

             Assessment   Right hip pain M25.551                   Active



                                                                                
                                                                   



Medications

          



        Medication Code System Code    Instructions Start Date End Date Status  

Dosage

 

                MetFORMIN HCl ER NDC             [address]-87   500 MG Orally O

nce a day X 4 days, then 1 tab

bid X 4 days then 2 in PM and in 1 AM x 4 days then 2 twice a day Oct 17, 2016  

             

                                                    1 tablet with evening meal

 

        Victoza NDC     [address]-06 18 MG/3ML Subcutaneous Once a day Oct 17, 

2016                 

.6mg daily X 1 week then 1.2mg daily X 1 week then 1.8mg daily

 

                Lyrica          NDC             [address]-31   75 MG Orally Twi

ce a day X1 week then  1 in AM and 2 in

PM X 1 week then 2 twice a day Oct 17, 2016                                    1

 capsule



                                                                                
                           



Procedures

          



                Procedure       Coding System   Code            Date

 

                Office Visit, New Pt., Level 3 CPT-4           74455           O

ct 2016

 

                GLYCATED HEMOGLOBIN TEST CPT-4           02848           Oct 17,

 2016



                                                                                
                           



Vital Signs

          



                          Date/Time:                Oct 17, 2016

 

                          Cardiac Monitoring Heart Rate 88 bpm

 

                          Weight                    227.0 lbs

 

                          Height                    69.75 in

 

                          BMI                       32.80 Index

 

                          Blood Pressure Diastolic  98 mmHg

 

                          Blood Pressure Systolic   146 mmHg



                                                                    



Results

          



           Name       Result     Date       Reference Range Unit       Abnormali

ty Flag

 

           A1C (IN HOUSE)                                              

 

           ----A1C IN HOUSE 10.3       2016   4.3 - 5.6 %              

 

           ----Lot    0630       [id number]                          

 

           ----Exp date 2016                          



                                                                    



Summary Purpose

          eClinicalWorks Submission
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
## 2020-05-31 NOTE — XMS REPORT
[facility]

                             Created on: 10/31/2016



Rl Barraza

External Reference #: [id number]

: 1965

Sex: Male



Demographics





                          Address                   [address] [place]  [address]

 

                          Home Phone                [phone]

 

                          Preferred Language        Unknown

 

                          Marital Status            Unknown

 

                          Orthodox Affiliation     Unknown

 

                          Race                      White

 

                          Ethnic Group              Not  or 





Author





                          Author                    Rl VICTOR

 

                          Organization              [employer]

 

                          Address                   Unknown

 

                          Phone                     Unavailable







Care Team Providers





                    Care Team Member Name Role                Phone

 

                    FRANCESCA VICTOR    CP                  Unavailable



                                                                



Allergies

          No Known Allergies                                                    
                                   



Problems

          



             Problem Type Condition    Code         Onset Dates  Condition Statu

s

 

             Problem      Neuropathy   G62.9                     Active

 

                          Assessment                Type 2 diabetes mellitus wit

h complication, without long-term current

use of insulin      E11.8                                   Active

 

                          Problem                   Type 2 diabetes mellitus wit

h complication, without long-term current 

use of insulin      E11.8                                   Active



                                                                                
                           



Medications

          No Known Medications                                                  
                                     



Procedures

          



                Procedure       Coding System   Code            Date

 

                LIPID PANEL     CPT-4           56421           Oct 31, 2016

 

                COMPREHEN METABOLIC PANEL CPT-4           18680           Oct 31

, 2016

 

                COMPLETE CBC W/AUTO DIFF WBC CPT-4           04816           Oct

 31, 2016

 

                VENIPUNCT, ROUTINE* CPT-4           62666           Oct 31, 2016



                                                                                
                 



Results

          



           Name       Result     Date       Reference Range Unit       Abnormali

ty Flag

 

           ROUTINE VENIPUNCTURE                                              



                                                                    



Summary Purpose

          [employer] Submission
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
## 2020-05-31 NOTE — XMS REPORT
[facility]

                             Created on: 2017



Rl Barraza

External Reference #: [id number]

: 1965

Sex: Male



Demographics





                          Address                   [address] [place]  [address]

 

                          Preferred Language        Unknown

 

                          Marital Status            Unknown

 

                          Mosque Affiliation     Unknown

 

                          Race                      Unknown

 

                          Ethnic Group              Unknown





Author





                          Author                    Rl VICTOR

 

                          [facility] [address]

 

                          Address                   [address] [place]  [address]



 

                          Phone                     [phone]







Care Team Providers





                    Care Team Member Name Role                Phone

 

                    FRANCESCA VICTOR    Unavailable         [phone]







PROBLEMS





          Type      Condition ICD9-CM Code BQH42-OI Code Onset Dates Condition S

tatus SNOMED 

Code

 

          Problem   Right hip pain           M25.551             Active    41500

[id number]

 

          Problem   Hypertriglyceridemia           E78.1               Active   

 [id number]

 

          Problem   Neuropathy           G62.9               Active    [id number]

 

                          Problem                   Type 2 diabetes mellitus wit

h complication, without long-term current 

use of insulin              E11.8                     Active       [id number]







ALLERGIES

Unknown Allergies



SOCIAL HISTORY

No smoking Hx information available



PLAN OF CARE





VITAL SIGNS





MEDICATIONS

Unknown Medications



RESULTS

No Results



PROCEDURES

No Known procedures



IMMUNIZATIONS

No Known Immunizations
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
## 2020-05-31 NOTE — XMS REPORT
Continuity of Care Document

                             Created on: 2020



NATALIYAJULIO CCJ

External Reference #: [id number]

: 1965

Sex: Male



Demographics





                          Home Phone                [phone]

 

                          Preferred Language        Unknown

 

                          Marital Status            Unknown

 

                          Spiritism Affiliation     Unknown

 

                          Race                      Unknown

 

                          Ethnic Group              Unknown





Author





                          Organization              Unknown

 

                          Address                   Unknown

 

                          Phone                     Unavailable



              



Allergies

      



             Active           Description           Code           Type         

  Severity   

                Reaction           Onset           Reported/Identified          

 

Relationship to Patient                 Clinical Status        

 

                Yes             No Known Drug Allergies           [id number]    

       Drug 

Allergy           Unknown           N/A                             11/15/2016  

      

                                                             

 

           Yes           THC           THC                       Unknown        

   N/A     

                                2017                                    



                    



Medications

      



There is no data.                  



Problems

      



             Date Dx Coded           Attending           Type           Code    

       

Diagnosis                               Diagnosed By        

 

                2016           DISHA LISA ARNP           Ot           

   S43.421A        

                          SPRAIN OF RIGHT ROTATOR CUFF CAPSULE, IN              

      

 

                2016           DISHA LISA ARNP           Ot           

   X39.8XXA        

                          OTHER EXPOSURE TO FORCES OF NATURE, INIT              

      

 

             2016           DISHA LISA ARNP           Ot           Y99

.0           

CIVILIAN ACTIVITY DONE FOR INCOME OR PAY                    

 

                2016           DISHA LISA ARNP           Ot           

   S43.421A        

                          SPRAIN OF RIGHT ROTATOR CUFF CAPSULE, IN              

      

 

                2016           DISHA LISA ARNP           Ot           

   X39.8XXA        

                          OTHER EXPOSURE TO FORCES OF NATURE, INIT              

      

 

             2016           DISHA, LISA ARNP           Ot           Y99

.0           

CIVILIAN ACTIVITY DONE FOR INCOME OR PAY                    

 

                2016           DISHA LISA ARNP           Ot           

   S43.421A        

                          SPRAIN OF RIGHT ROTATOR CUFF CAPSULE, IN              

      

 

                2016           DISHA, LISA ARNP           Ot           

   X39.8XXA        

                          OTHER EXPOSURE TO FORCES OF NATURE, INIT              

      

 

             2016           DISHA, LISA ARNP           Ot           Y99

.0           

CIVILIAN ACTIVITY DONE FOR INCOME OR PAY                    

 

                11/15/2016           JEWELS GARCIA APRN           Ot           

   R10.31          

                          RIGHT LOWER QUADRANT PAIN                    

 

             11/15/2016           JEWELS GARCIA APRN           Ot           R93

.8           

ABNORMAL FINDINGS ON DIAGNOSTIC IMAGING                     

 

                2016           JEWELS GARCIA APRN           Ot           

   R10.31          

                          RIGHT LOWER QUADRANT PAIN                    

 

             2016           JEWELS GARCIA APRN           Ot           R93

.8           

ABNORMAL FINDINGS ON DIAGNOSTIC IMAGING                     

 

                2016           JEWELS GARCIA APRN           Ot           

   R10.31          

                          RIGHT LOWER QUADRANT PAIN                    

 

             2016           JEWELS GARCIA APRN           Ot           R93

.8           

ABNORMAL FINDINGS ON DIAGNOSTIC IMAGING                     

 

             2017           ASHELY PARDO MD           Ot           E11

.9           

TYPE 2 DIABETES MELLITUS WITHOUT COMPLIC                    

 

                2017           ASHELY PARDO MD           Ot           

   F17.210         

                          NICOTINE DEPENDENCE, CIGARETTES, UNCOMPL              

      

 

                2017           ASHELY PARDO MD           Ot           

   G89.29          

                          OTHER CHRONIC PAIN                    

 

                2017           ASHELY PARDO MD           Ot           

   M25.551         

                          PAIN IN RIGHT HIP                    

 

                2017           ASHELY PARDO MD           Ot           

   R10.31          

                          RIGHT LOWER QUADRANT PAIN                    

 

                2017           ASHELY PARDO MD           Ot           

   Z79.84          

                          LONG TERM (CURRENT) USE OF ORAL HYPOGLYC              

      

 

                2019           JEWELS GARCIA           Ot           

   E11.65          

                          TYPE 2 DIABETES MELLITUS WITH HYPERGLYCE              

      

 

                2019           JEWELS GARCIA           Ot           

   E78.00          

                          PURE HYPERCHOLESTEROLEMIA, UNSPECIFIED                

    

 

                2019           JEWELS GARCIA           Ot           

   F12.10          

                          CANNABIS ABUSE, UNCOMPLICATED                    

 

                2019           JEWELS GARCIA           Ot           

   F17.210         

                          NICOTINE DEPENDENCE, CIGARETTES, UNCOMPL              

      

 

                2019           JEWELS GARCIA           Ot           

   L03.115         

                          CELLULITIS OF RIGHT LOWER LIMB                    

 

                2019           JEWELS GARCIA           Ot           

   Z98.890         

                          OTHER SPECIFIED POSTPROCEDURAL STATES                 

   

 

                2019           JEWELS GARCIA           Ot           

   E11.65          

                          TYPE 2 DIABETES MELLITUS WITH HYPERGLYCE              

      

 

                2019           JEWELS GARCIA           Ot           

   E78.00          

                          PURE HYPERCHOLESTEROLEMIA, UNSPECIFIED                

    

 

                2019           JEWELS GARCIA           Ot           

   F12.10          

                          CANNABIS ABUSE, UNCOMPLICATED                    

 

                2019           JEWELS GARCIA           Ot           

   F17.210         

                          NICOTINE DEPENDENCE, CIGARETTES, UNCOMPL              

      

 

                2019           JEWELS GARCIA           Ot           

   L03.115         

                          CELLULITIS OF RIGHT LOWER LIMB                    

 

                2019           JEWELS GARCIA           Ot           

   Z98.890         

                          OTHER SPECIFIED POSTPROCEDURAL STATES                 

   



                                                                                
 



Procedures

      



There is no data.                  



Results

      



                    Test                Result              Range        

 

                                        CBC With Differential/Platelet - 10/31/1

6 11:19         

 

                    WBC                 9.2 x10E3/uL           3.4-10.8        

 

                    RBC                 4.66 x10E6/uL           4.14-5.80       

 

 

                    Hemoglobin           14.2 g/dL           12.6-17.7        

 

                    Hematocrit           41.0 %              37.5-51.0        

 

                    MCV                 88 fL               79-97        

 

                    MCH                 30.5 pg             26.6-33.0        

 

                    MCHC                34.6 g/dL           31.5-35.7        

 

                    RDW                 12.9 %              12.3-15.4        

 

                    Platelets           293 x10E3/uL           150-379        

 

                    Neutrophils           51 %                         

 

                    Lymphs              36 %                         

 

                    Monocytes           8 %                          

 

                    Eos                 4 %                          

 

                    Basos               1 %                          

 

                    Neutrophils (Absolute)           4.8 x10E3/uL           1.4-

7.0        

 

                    Lymphs (Absolute)           3.3 x10E3/uL           0.7-3.1  

      

 

                    Monocytes(Absolute)           0.7 x10E3/uL           0.1-0.9

        

 

                    Eos (Absolute)           0.3 x10E3/uL           0.0-0.4     

   

 

                    Baso (Absolute)           0.1 x10E3/uL           0.0-0.2    

    

 

                    Immature Granulocytes           0 %                         

 

 

                    Immature Grans (Abs)           0.0 x10E3/uL           0.0-0.

1        

 

                                        Comp. Metabolic Panel (14) - 10/31/16 11

:19         

 

                    Glucose, Serum           163 mg/dL           65-99        

 

                    BUN                 10 mg/dL            6-24        

 

                    Creatinine, Serum           0.64 mg/dL           0.76-1.27  

      

 

                    eGFR If NonAfricn Am           114 mL/min/1.73              

 >59        

 

                    eGFR If Africn Am           132 mL/min/1.73               >5

9        

 

                    BUN/Creatinine Ratio           16                  9-20     

   

 

                    Sodium, Serum           138 mmol/L           136-144        

 

                    Potassium, Serum           4.7 mmol/L           3.5-5.2     

   

 

                    Chloride, Serum           99 mmol/L                   

 

                    Carbon Dioxide, Total           22 mmol/L           18-29   

     

 

                    Calcium, Serum           9.3 mg/dL           8.7-10.2       

 

 

                    Protein, Total, Serum           6.6 g/dL            6.0-8.5 

       

 

                    Albumin, Serum           3.9 g/dL            3.5-5.5        

 

                    Globulin, Total           2.7 g/dL            1.5-4.5       

 

 

                    A/G Ratio           1.4                 1.1-2.5        

 

                    Bilirubin, Total           <0.2 mg/dL           0.0-1.2     

   

 

                    Alkaline Phosphatase, S           90 IU/L             

        

 

                    AST (SGOT)           13 IU/L             0-40        

 

                    ALT (SGPT)           21 IU/L             0-44        

 

                                        Lipid Panel - 10/31/16 11:19         

 

                    Cholesterol, Total           209 mg/dL           100-199    

    

 

                    Triglycerides           672 mg/dL           0-149        

 

                    HDL Cholesterol           29 mg/dL            >39        

 

                    VLDL Cholesterol Tevin           Comment mg/dL           5-40 

       

 

                    LDL Cholesterol Calc           Comment mg/dL           0-99 

       

 

                                        Complete blood count (CBC) with automate

d white blood cell (WBC) differential - 

11/15/16 12:37         

 

                          Blood leukocytes automated count (number/volume)      

     9.7 10*3/uL          

                                        4.3-11.0        

 

                          Blood erythrocytes automated count (number/volume)    

       4.82 10*6/uL       

                                        4.35-5.85        

 

                    Venous blood hemoglobin measurement (mass/volume)           

14.7 g/dL           

13.3-17.7        

 

                    Blood hematocrit (volume fraction)           42 %           

     40-54        

 

                    Automated erythrocyte mean corpuscular volume           87 [

foz_us]           

80-99        

 

                                        Automated erythrocyte mean corpuscular h

emoglobin (mass per erythrocyte)        

                          31 pg                     25-34        

 

                                        Automated erythrocyte mean corpuscular h

emoglobin concentration measurement 

(mass/volume)             35 g/dL                   32-36        

 

                    Automated erythrocyte distribution width ratio           11.

6 %              10.0-

14.5        

 

                    Automated blood platelet count (count/volume)           281 

10*3/uL           

130-400        

 

                          Automated blood platelet mean volume measurement      

     10.4 [foz_us]        

                                        7.4-10.4        

 

                    Automated blood neutrophils/100 leukocytes           63 %   

             42-75       

 

 

                    Automated blood lymphocytes/100 leukocytes           27 %   

             12-44       

 

 

                    Blood monocytes/100 leukocytes           7 %                

 0-12        

 

                    Automated blood eosinophils/100 leukocytes           3 %    

             0-10        

 

                    Automated blood basophils/100 leukocytes           1 %      

           0-10        

 

                    Blood neutrophils automated count (number/volume)           

6.2 10*3            

1.8-7.8        

 

                    Blood lymphocytes automated count (number/volume)           

2.6 10*3            

1.0-4.0        

 

                    Blood monocytes automated count (number/volume)           0.

6 10*3            

0.0-1.0        

 

                    Automated eosinophil count           0.3 10*3/uL           0

.0-0.3        

 

                    Automated blood basophil count (count/volume)           0.1 

10*3/uL           

0.0-0.1        

 

                                        Comprehensive metabolic panel - 11/15/16

 12:37         

 

                          Serum or plasma sodium measurement (moles/volume)     

      138 mmol/L          

                                        135-145        

 

                          Serum or plasma potassium measurement (moles/volume)  

         4.0 mmol/L       

                                        3.6-5.0        

 

                          Serum or plasma chloride measurement (moles/volume)   

        101 mmol/L        

                                                

 

                    Carbon dioxide           28 mmol/L           21-32        

 

                          Serum or plasma anion gap determination (moles/volume)

           9 mmol/L       

                                        5-14        

 

                          Serum or plasma urea nitrogen measurement (mass/volume

)           12 mg/dL      

                                        7-18        

 

                          Serum or plasma creatinine measurement (mass/volume)  

         0.80 mg/dL       

                                        0.60-1.30        

 

                    Serum or plasma urea nitrogen/creatinine mass ratio         

  15                  NRG 

       

 

                                        Serum or plasma creatinine measurement w

ith calculation of estimated glomerular 

filtration rate           >                         NRG        

 

                    Serum or plasma glucose measurement (mass/volume)           

188 mg/dL           

        

 

                    Serum or plasma calcium measurement (mass/volume)           

9.4 mg/dL           

8.5-10.1        

 

                          Serum or plasma total bilirubin measurement (mass/volu

me)           0.4 mg/dL   

                                        0.1-1.0        

 

                                        Serum or plasma alkaline phosphatase tim

surement (enzymatic activity/volume)    

                          80 U/L                            

 

                                        Serum or plasma aspartate aminotransfera

se measurement (enzymatic 

activity/volume)           15 U/L                    5-34        

 

                                        Serum or plasma alanine aminotransferase

 measurement (enzymatic activity/volume)

                          18 U/L                    0-55        

 

                    Serum or plasma protein measurement (mass/volume)           

7.3 g/dL            

6.4-8.2        

 

                    Serum or plasma albumin measurement (mass/volume)           

4.1 g/dL            

3.2-4.5        

 

                                        Complete urinalysis with reflex to cultu

re - 11/15/16 13:30         

 

                    Urine color determination           YELLOW              NRG 

       

 

                    Urine clarity determination           CLEAR               NR

G        

 

                    Urine pH measurement by test strip           6              

     5-9        

 

                    Specific gravity of urine by test strip           1.025     

          1.016-1.022  

      

 

                    Urine protein assay by test strip, semi-quantitative        

   3+                  

NEGATIVE        

 

                    Urine glucose detection by automated test strip           2+

                  NEGATIVE

        

 

                          Erythrocytes detection in urine sediment by light micr

oscopy           NEGATIVE 

                                        NEGATIVE        

 

                    Urine ketones detection by automated test strip           NE

GATIVE            

NEGATIVE        

 

                    Urine nitrite detection by test strip           NEGATIVE    

        NEGATIVE    

    

 

                    Urine total bilirubin detection by test strip           NEGA

TIVE            

NEGATIVE        

 

                          Urine urobilinogen measurement by automated test strip

 (mass/volume)           

NORMAL                                  NORMAL        

 

                    Urine leukocyte esterase detection by dipstick           NEG

ATIVE            

NEGATIVE        

 

                                        Automated urine sediment erythrocyte cou

nt by microscopy (number/high power 

field)                    NONE                      NRG        

 

                                        Automated urine sediment leukocyte count

 by microscopy (number/high power field)

                          RARE                      NRG        

 

                          Bacteria detection in urine sediment by light microsco

py           NEGATIVE     

                                        NRG        

 

                                        Squamous epithelial cells detection in u

rine sediment by light microscopy       

                          RARE                      NRG        

 

                          Crystals detection in urine sediment by light microsco

py           NONE         

                                        NRG        

 

                    Casts detection in urine sediment by light microscopy       

    NONE                

NRG        

 

                          Mucus detection in urine sediment by light microscopy 

          SMALL           

                                        NRG        

 

                    Complete urinalysis with reflex to culture           NO     

             NRG        

 

                                        Microalb/Creat Ratio, Rand Ur - 

6 08:44         

 

                    Creatinine, Urine           106.5 mg/dL           Not Estab.

        

 

                    Microalbumin, Urine           234.0 ug/mL           Not Esta

b.        

 

                    Microalb/Creat Ratio           219.7 mg/g creat           0.

0-30.0        

 

                                        Complete blood count (CBC) with automate

d white blood cell (WBC) differential - 

17 08:50         

 

                          Blood leukocytes automated count (number/volume)      

     11.6 10*3/uL         

                                        4.3-11.0        

 

                          Blood erythrocytes automated count (number/volume)    

       4.56 10*6/uL       

                                        4.35-5.85        

 

                    Venous blood hemoglobin measurement (mass/volume)           

13.8 g/dL           

13.3-17.7        

 

                    Blood hematocrit (volume fraction)           40 %           

     40-54        

 

                    Automated erythrocyte mean corpuscular volume           88 [

foz_us]           

80-99        

 

                                        Automated erythrocyte mean corpuscular h

emoglobin (mass per erythrocyte)        

                          30 pg                     25-34        

 

                                        Automated erythrocyte mean corpuscular h

emoglobin concentration measurement 

(mass/volume)             35 g/dL                   32-36        

 

                    Automated erythrocyte distribution width ratio           11.

9 %              10.0-

14.5        

 

                    Automated blood platelet count (count/volume)           278 

10*3/uL           

130-400        

 

                          Automated blood platelet mean volume measurement      

     10.4 [foz_us]        

                                        7.4-10.4        

 

                    Automated blood neutrophils/100 leukocytes           64 %   

             42-75       

 

 

                    Automated blood lymphocytes/100 leukocytes           27 %   

             12-44       

 

 

                    Blood monocytes/100 leukocytes           7 %                

 0-12        

 

                    Automated blood eosinophils/100 leukocytes           2 %    

             0-10        

 

                    Automated blood basophils/100 leukocytes           1 %      

           0-10        

 

                    Blood neutrophils automated count (number/volume)           

7.4 10*3            

1.8-7.8        

 

                    Blood lymphocytes automated count (number/volume)           

3.1 10*3            

1.0-4.0        

 

                    Blood monocytes automated count (number/volume)           0.

8 10*3            

0.0-1.0        

 

                    Automated eosinophil count           0.3 10*3/uL           0

.0-0.3        

 

                    Automated blood basophil count (count/volume)           0.1 

10*3/uL           

0.0-0.1        

 

                                        Comprehensive metabolic panel - 17

 08:50         

 

                          Serum or plasma sodium measurement (moles/volume)     

      134 mmol/L          

                                        135-145        

 

                          Serum or plasma potassium measurement (moles/volume)  

         4.4 mmol/L       

                                        3.6-5.0        

 

                          Serum or plasma chloride measurement (moles/volume)   

        100 mmol/L        

                                                

 

                    Carbon dioxide           23 mmol/L           21-32        

 

                          Serum or plasma anion gap determination (moles/volume)

           11 mmol/L      

                                        5-14        

 

                          Serum or plasma urea nitrogen measurement (mass/volume

)           13 mg/dL      

                                        7-18        

 

                          Serum or plasma creatinine measurement (mass/volume)  

         0.82 mg/dL       

                                        0.60-1.30        

 

                    Serum or plasma urea nitrogen/creatinine mass ratio         

  16                  NRG 

       

 

                                        Serum or plasma creatinine measurement w

ith calculation of estimated glomerular 

filtration rate           >                         NRG        

 

                    Serum or plasma glucose measurement (mass/volume)           

233 mg/dL           

        

 

                    Serum or plasma calcium measurement (mass/volume)           

9.2 mg/dL           

8.5-10.1        

 

                          Serum or plasma total bilirubin measurement (mass/volu

me)           0.3 mg/dL   

                                        0.1-1.0        

 

                                        Serum or plasma alkaline phosphatase tim

surement (enzymatic activity/volume)    

                          89 U/L                            

 

                                        Serum or plasma aspartate aminotransfera

se measurement (enzymatic 

activity/volume)           13 U/L                    5-34        

 

                                        Serum or plasma alanine aminotransferase

 measurement (enzymatic activity/volume)

                          22 U/L                    0-55        

 

                    Serum or plasma protein measurement (mass/volume)           

6.9 g/dL            

6.4-8.2        

 

                    Serum or plasma albumin measurement (mass/volume)           

3.8 g/dL            

3.2-4.5        

 

                                        Complete urinalysis with reflex to cultu

re - 17 08:55         

 

                    Urine color determination           YELLOW              NRG 

       

 

                    Urine clarity determination           CLEAR               NR

G        

 

                    Urine pH measurement by test strip           5              

     5-9        

 

                    Specific gravity of urine by test strip           1.015     

          1.016-1.022  

      

 

                    Urine protein assay by test strip, semi-quantitative        

   2+                  

NEGATIVE        

 

                    Urine glucose detection by automated test strip           4+

                  NEGATIVE

        

 

                          Erythrocytes detection in urine sediment by light micr

oscopy           NEGATIVE 

                                        NEGATIVE        

 

                    Urine ketones detection by automated test strip           NE

GATIVE            

NEGATIVE        

 

                    Urine nitrite detection by test strip           NEGATIVE    

        NEGATIVE    

    

 

                    Urine total bilirubin detection by test strip           NEGA

TIVE            

NEGATIVE        

 

                          Urine urobilinogen measurement by automated test strip

 (mass/volume)           

NORMAL                                  NORMAL        

 

                    Urine leukocyte esterase detection by dipstick           NEG

ATIVE            

NEGATIVE        

 

                                        Automated urine sediment erythrocyte cou

nt by microscopy (number/high power 

field)                    NONE                      NRG        

 

                                        Automated urine sediment leukocyte count

 by microscopy (number/high power field)

                          NONE                      NRG        

 

                          Bacteria detection in urine sediment by light microsco

py           NEGATIVE     

                                        NRG        

 

                                        Squamous epithelial cells detection in u

rine sediment by light microscopy       

                          NONE                      NRG        

 

                          Crystals detection in urine sediment by light microsco

py           NONE         

                                        NRG        

 

                    Casts detection in urine sediment by light microscopy       

    NONE                

NRG        

 

                          Mucus detection in urine sediment by light microscopy 

          NEGATIVE        

                                        NRG        

 

                    Complete urinalysis with reflex to culture           NO     

             NRG        

 

                                        CBC With Differential/Platelet - 

7 09:32         

 

                    WBC                 9.6 x10E3/uL           3.4-10.8        

 

                    RBC                 4.85 x10E6/uL           4.14-5.80       

 

 

                    Hemoglobin           14.4 g/dL           12.6-17.7        

 

                    Hematocrit           42.5 %              37.5-51.0        

 

                    MCV                 88 fL               79-97        

 

                    MCH                 29.7 pg             26.6-33.0        

 

                    MCHC                33.9 g/dL           31.5-35.7        

 

                    RDW                 13.2 %              12.3-15.4        

 

                    Platelets           287 x10E3/uL           150-379        

 

                    Neutrophils           62 %                         

 

                    Lymphs              28 %                         

 

                    Monocytes           7 %                          

 

                    Eos                 2 %                          

 

                    Basos               1 %                          

 

                    Neutrophils (Absolute)           5.9 x10E3/uL           1.4-

7.0        

 

                    Lymphs (Absolute)           2.7 x10E3/uL           0.7-3.1  

      

 

                    Monocytes(Absolute)           0.7 x10E3/uL           0.1-0.9

        

 

                    Eos (Absolute)           0.2 x10E3/uL           0.0-0.4     

   

 

                    Baso (Absolute)           0.1 x10E3/uL           0.0-0.2    

    

 

                    Immature Granulocytes           0 %                         

 

 

                    Immature Grans (Abs)           0.0 x10E3/uL           0.0-0.

1        

 

                                        Comp. Metabolic Panel (14) - 17 09

:32         

 

                    Glucose, Serum           215 mg/dL           65-99        

 

                    BUN                 10 mg/dL            6-24        

 

                    Creatinine, Serum           0.66 mg/dL           0.76-1.27  

      

 

                    eGFR If NonAfricn Am           112 mL/min/1.73              

 >59        

 

                    eGFR If Africn Am           129 mL/min/1.73               >5

9        

 

                    BUN/Creatinine Ratio           15                  9-20     

   

 

                    Sodium, Serum           137 mmol/L           134-144        

 

                    Potassium, Serum           4.5 mmol/L           3.5-5.2     

   

 

                    Chloride, Serum           98 mmol/L                   

 

                    Carbon Dioxide, Total           23 mmol/L           18-29   

     

 

                    Calcium, Serum           9.3 mg/dL           8.7-10.2       

 

 

                    Protein, Total, Serum           7.0 g/dL            6.0-8.5 

       

 

                    Albumin, Serum           4.0 g/dL            3.5-5.5        

 

                    Globulin, Total           3.0 g/dL            1.5-4.5       

 

 

                    A/G Ratio           1.3                 1.1-2.5        

 

                    Bilirubin, Total           0.2 mg/dL           0.0-1.2      

  

 

                    Alkaline Phosphatase, S           89 IU/L             

        

 

                    AST (SGOT)           17 IU/L             0-40        

 

                    ALT (SGPT)           23 IU/L             0-44        

 

                                        Lipid Panel - 17 09:32         

 

                    Cholesterol, Total           172 mg/dL           100-199    

    

 

                    Triglycerides           618 mg/dL           0-149        

 

                    HDL Cholesterol           27 mg/dL            >39        

 

                    VLDL Cholesterol Tevin           Comment mg/dL           5-40 

       

 

                    LDL Cholesterol Calc           Comment mg/dL           0-99 

       

 

                                        Urine Culture, Routine - 17 09:32 

        

 

                    Urine Culture, Routine           Note                       

  

 

                                        Microalb/Creat Ratio, Rand Ur - 

7 09:27         

 

                    Creatinine, Urine           44.7 mg/dL           Not Estab. 

       

 

                    Microalbumin, Urine           222.4 ug/mL           Not Esta

b.        

 

                    Microalb/Creat Ratio           497.5 mg/g creat           0.

0-30.0        

 

                                        PDM - AMPHETAMINES W/ REFLEX d/l ISOMERS

 - 18 09:13         

 

                    COMMENT                                 NRG        

 

                    Prescribed Drug 2           Lyrica(TM)            NRG       

 

 

                    Amphetamine           NEGATIVE ng/mL           <250        

 

                    medMATCH Amphetamine           CONSISTENT            NRG    

    

 

                    Methamphetamine           NEGATIVE ng/mL           <250     

   

 

                    medMATCH Methamphetamine           CONSISTENT            NRG

        

 

                                        LIPID PANEL - 18 11:25         

 

                    CHOLESTEROL, TOTAL           134 mg/dL           <200       

 

 

                    HDL CHOLESTEROL           33 mg/dL            >40        

 

                    TRIGLYCERIDES           182 mg/dL           <150        

 

                    LDL-CHOLESTEROL           73 mg/dL (calc)           NRG     

   

 

                    CHOL/HDLC RATIO           4.1 (calc)           <5.0        

 

                    NON HDL CHOLESTEROL           101 mg/dL (calc)           <13

0        

 

                                        Complete blood count (CBC) with automate

d white blood cell (WBC) differential - 

19 16:50         

 

                          Blood leukocytes automated count (number/volume)      

     14.7 10*3/uL         

                                        4.3-11.0        

 

                          Blood erythrocytes automated count (number/volume)    

       4.98 10*6/uL       

                                        4.35-5.85        

 

                    Venous blood hemoglobin measurement (mass/volume)           

14.4 g/dL           

13.3-17.7        

 

                    Blood hematocrit (volume fraction)           42 %           

     40-54        

 

                    Automated erythrocyte mean corpuscular volume           84 [

foz_us]           

80-99        

 

                                        Automated erythrocyte mean corpuscular h

emoglobin (mass per erythrocyte)        

                          29 pg                     25-34        

 

                                        Automated erythrocyte mean corpuscular h

emoglobin concentration measurement 

(mass/volume)             34 g/dL                   32-36        

 

                    Automated erythrocyte distribution width ratio           11.

9 %              10.0-

14.5        

 

                    Automated blood platelet count (count/volume)           348 

10*3/uL           

130-400        

 

                          Automated blood platelet mean volume measurement      

     10.8 [foz_us]        

                                        7.4-10.4        

 

                    Automated blood neutrophils/100 leukocytes           82 %   

             42-75       

 

 

                    Automated blood lymphocytes/100 leukocytes           12 %   

             12-44       

 

 

                    Blood monocytes/100 leukocytes           6 %                

 0-12        

 

                    Automated blood eosinophils/100 leukocytes           1 %    

             0-10        

 

                    Automated blood basophils/100 leukocytes           0 %      

           0-10        

 

                    Blood neutrophils automated count (number/volume)           

12.0 10*3           

1.8-7.8        

 

                    Blood lymphocytes automated count (number/volume)           

1.7 10*3            

1.0-4.0        

 

                    Blood monocytes automated count (number/volume)           0.

9 10*3            

0.0-1.0        

 

                    Automated eosinophil count           0.1 10*3/uL           0

.0-0.3        

 

                    Automated blood basophil count (count/volume)           0.1 

10*3/uL           

0.0-0.1        

 

                                        Comprehensive metabolic panel - 19

 16:50         

 

                          Serum or plasma sodium measurement (moles/volume)     

      127 mmol/L          

                                        135-145        

 

                          Serum or plasma potassium measurement (moles/volume)  

         4.7 mmol/L       

                                        3.6-5.0        

 

                          Serum or plasma chloride measurement (moles/volume)   

        92 mmol/L         

                                                

 

                    Carbon dioxide           22 mmol/L           21-32        

 

                          Serum or plasma anion gap determination (moles/volume)

           13 mmol/L      

                                        5-14        

 

                          Serum or plasma urea nitrogen measurement (mass/volume

)           15 mg/dL      

                                        7-18        

 

                          Serum or plasma creatinine measurement (mass/volume)  

         1.12 mg/dL       

                                        0.60-1.30        

 

                    Serum or plasma urea nitrogen/creatinine mass ratio         

  13                  NRG 

       

 

                                        Serum or plasma creatinine measurement w

ith calculation of estimated glomerular 

filtration rate           >                         NRG        

 

                    Serum or plasma glucose measurement (mass/volume)           

682 mg/dL           

        

 

                    Serum or plasma calcium measurement (mass/volume)           

9.8 mg/dL           

8.5-10.1        

 

                          Serum or plasma total bilirubin measurement (mass/volu

me)           0.3 mg/dL   

                                        0.1-1.0        

 

                                        Serum or plasma alkaline phosphatase tim

surement (enzymatic activity/volume)    

                          140 U/L                           

 

                                        Serum or plasma aspartate aminotransfera

se measurement (enzymatic 

activity/volume)           10 U/L                    5-34        

 

                                        Serum or plasma alanine aminotransferase

 measurement (enzymatic activity/volume)

                          14 U/L                    0-55        

 

                    Serum or plasma protein measurement (mass/volume)           

7.8 g/dL            

6.4-8.2        

 

                    Serum or plasma albumin measurement (mass/volume)           

3.9 g/dL            

3.2-4.5        

 

                    CALCIUM CORRECTED           9.9 mg/dL           8.5-10.1    

    

 

                                        Blood manual differential performed dete

ction - 19 16:50         

 

                    Blood monocytes/100 leukocytes           7 %                

 NRG        

 

                    Manual blood segmented neutrophils/100 leukocytes           

82 %                NRG  

      

 

                    Manual blood lymphocytes/100 leukocytes           8 %       

          NRG        

 

                    Manual blood basophils/100 leukocytes           3 %         

        NRG        

 

                    Blood erythrocyte morphology finding identification         

  NORMAL              

NRG        

 

                                        Gram stain microscopy - 19 17:15  

       

 

                          Gram stain microscopy           Moderate Gram positive

 cocci in clusters        

                                        NRG        

 

                                        Bacteria identification in wound by cult

ure - 19 17:15         

 

                    Bacteria identification in wound by culture           SEE RE

PORT            NRG 

       

 

                    FREE TEXT EXTERNAL           ID REPORTED 1/15/19 17:05      

      NRG        

 

                    QUANTITY OF GROWTH           .                   NRG        

 

                    FREE TEXT ENTRY 2           SENSITIVITY REPORTED 19 09:

05            NRG   

     

 

                                        RML Sensitivity Panel - 19 17:15  

       

 

                          Oxacillin susceptibility test by minimum inhibitory co

ncentration           R   

                                        NRG        

 

                          Clindamycin susceptibility test by minimum inhibitory 

concentration           <=

                                        NRG        

 

                          Erythromycin susceptibility test by minimum inhibitory

 concentration           >

                                        NRG        

 

                                        Trimethoprim/sulfamethoxazole susceptibi

lity test by minimum 

inhibitoryconcentration           S                         NRG        

 

                          Vancomycin susceptibility test by minimum inhibitory c

oncentration           1  

                                        NRG        

 

                          Levofloxacin susceptibility test by minimum inhibitory

 concentration           4

                                        NRG        

 

                          Rifampin susceptibility test by minimum inhibitory con

centration           <=   

                                        NRG        

 

                          Cefazolin susceptibility test by minimum inhibitory co

ncentration           >   

                                        NRG        

 

                          Linezolid susceptibility test by minimum inhibitory co

ncentration           2   

                                        NRG        

 

                          Penicillin G susceptibility test by minimum inhibitory

 concentration           >

                                        NRG        

 

                          Moxifloxacin susceptibility test by minimum inhibitory

 concentration           S

                                        NRG        

 

                    Minocycline susc NISHANT           <=                  NRG      

  

 

                                        Capillary blood glucose measurement by g

lucometer (mass/volume) - 19 18:44

         

 

                          Capillary blood glucose measurement by glucometer (mas

s/volume)           489 

mg/dL                                           

 

                                        Capillary blood glucose measurement by g

lucometer (mass/volume) - 19 19:44

         

 

                          Capillary blood glucose measurement by glucometer (mas

s/volume)           340 

mg/dL                                           

 

                                        Capillary blood glucose measurement by g

lucometer (mass/volume) - 19 20:29

         

 

                          Capillary blood glucose measurement by glucometer (mas

s/volume)           254 

mg/dL                                           



                                                                  



Encounters

      



                ACCT No.           Visit Date/Time           Discharge          

 Status         

             Pt. Type           Provider           Facility           Loc./Unit 

          

Complaint        

 

             [id number]           2017 08:40:00                         

            

Document Registration                                                           

         

 

                    [id number]           2019 16:15:00           

019 20:38:00        

                DIS             Emergency           JEWELS GARCIA         

  [facility]           ER                        DIABETIC/R LEG INFECTIO

N        

 

                    [id number]           2017 08:29:00           

017 11:19:00        

                DIS             Emergency           ASHELY PARDO MD         

  [facility]           ER                        PELVIC PAIN        

 

                    [id number]           11/15/2016 12:27:00           11/15/2

016 14:10:00        

                DIS             Emergency           JEWELS GARCIA         

  [facility]           ER                        RIGHT SIDE/LOWER ABD/BA

CK PAIN      

  

 

                    [id number]           2016 09:09:00           

016 09:39:00        

                DIS             Outpatient           LISA GAUTHIER        

   [facility]           REHAB                     R ROTATOR CUFF APRIAN I

NJSUSHMA W 

TENDONITIS        

 

                    [id number]           2016 11:21:00           

016 23:59:59        

                [facility]S             Outpatient           COLTHARP ISAIAS VOGEL A        

   [facility]           OCC                                

 

             [id number]           2017 14:09:00                         

            

Document Registration                                                           

         

 

             [id number]           2016 08:35:00                         

            

Document Registration                                                           

         

 

             [id number]           06/15/2017 10:09:00                         

            

Document Registration                                                           

         

 

                [id number]           2018 11:20:00           2018 23:59:5

9           CLS 

                Outpatient           FRANCESCA CALVIN                     

      

[facility]                            

 

             [id number]           2018 11:20:00                              

       Document

 Registration                                                                   

 

 

             [id number]           2018 08:20:00                              

       Document

 Registration                                                                   

 

 

             [id number]           2016 13:05:00                         

            

Document Registration
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
## 2020-05-31 NOTE — NUR
X-ray in pt room. Pt refused to move affected extremity and continued to cuss 
at [facility]. Pt got up from chair and stated he was leaving.
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
## 2020-10-06 ENCOUNTER — HOSPITAL ENCOUNTER (EMERGENCY)
Dept: HOSPITAL 75 - ER | Age: 55
LOS: 1 days | Discharge: HOME | End: 2020-10-07
Payer: SELF-PAY

## 2020-10-06 VITALS — WEIGHT: 169.76 LBS | HEIGHT: 68.9 IN | BODY MASS INDEX: 25.14 KG/M2

## 2020-10-06 DIAGNOSIS — F17.210: ICD-10-CM

## 2020-10-06 DIAGNOSIS — G89.29: ICD-10-CM

## 2020-10-06 DIAGNOSIS — M25.512: Primary | ICD-10-CM

## 2020-10-06 DIAGNOSIS — E11.40: ICD-10-CM

## 2020-10-06 DIAGNOSIS — M54.9: ICD-10-CM

## 2020-10-06 DIAGNOSIS — Z91.14: ICD-10-CM

## 2020-10-06 DIAGNOSIS — I10: ICD-10-CM

## 2020-10-06 PROCEDURE — 99283 EMERGENCY DEPT VISIT LOW MDM: CPT

## 2020-10-06 PROCEDURE — 71046 X-RAY EXAM CHEST 2 VIEWS: CPT

## 2020-10-06 PROCEDURE — 73030 X-RAY EXAM OF SHOULDER: CPT
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
## 2020-10-07 VITALS — DIASTOLIC BLOOD PRESSURE: 88 MMHG | SYSTOLIC BLOOD PRESSURE: 150 MMHG

## 2020-10-07 NOTE — DIAGNOSTIC IMAGING REPORT
INDICATION: Left shoulder pain.



FINDINGS: 3 views.



Glenohumeral joints in good alignment. Articulating surfaces are

smooth. Joint spaces well-maintained. No fractures. AC shows good

alignment with mild hypertrophic change. No soft tissue

calcification about the shoulder.



IMPRESSION: Mild degenerative changes of the left shoulder with

no acute abnormalities.



Dictated by: 



  Dictated on workstation # [id number]

## 2020-10-07 NOTE — DIAGNOSTIC IMAGING REPORT
INDICATION: Left shoulder pain.



FINDINGS: PA and lateral views. The lungs are well-aerated and

clear. There is no air-trapping. The heart is not enlarged. No

pulmonary edema or hilar adenopathy. No pneumothorax or pleural

effusion. No bony abnormalities.



IMPRESSION: Negative PA and lateral chest.



Dictated by: 



  Dictated on workstation # [id number]

## 2022-06-29 ENCOUNTER — HOSPITAL ENCOUNTER (INPATIENT)
Dept: HOSPITAL 75 - ER | Age: 57
LOS: 1 days | Discharge: LEFT BEFORE DISCHARGE | DRG: 872 | End: 2022-06-30
Attending: INTERNAL MEDICINE | Admitting: INTERNAL MEDICINE
Payer: SELF-PAY

## 2022-06-29 VITALS — BODY MASS INDEX: 28.18 KG/M2 | WEIGHT: 196.87 LBS | HEIGHT: 70 IN

## 2022-06-29 VITALS — SYSTOLIC BLOOD PRESSURE: 117 MMHG | DIASTOLIC BLOOD PRESSURE: 60 MMHG

## 2022-06-29 VITALS — DIASTOLIC BLOOD PRESSURE: 53 MMHG | SYSTOLIC BLOOD PRESSURE: 148 MMHG

## 2022-06-29 VITALS — DIASTOLIC BLOOD PRESSURE: 66 MMHG | SYSTOLIC BLOOD PRESSURE: 118 MMHG

## 2022-06-29 DIAGNOSIS — L03.115: ICD-10-CM

## 2022-06-29 DIAGNOSIS — L02.611: ICD-10-CM

## 2022-06-29 DIAGNOSIS — E11.69: ICD-10-CM

## 2022-06-29 DIAGNOSIS — G89.29: ICD-10-CM

## 2022-06-29 DIAGNOSIS — M54.9: ICD-10-CM

## 2022-06-29 DIAGNOSIS — F15.90: ICD-10-CM

## 2022-06-29 DIAGNOSIS — L97.409: ICD-10-CM

## 2022-06-29 DIAGNOSIS — E78.00: ICD-10-CM

## 2022-06-29 DIAGNOSIS — M86.8X7: ICD-10-CM

## 2022-06-29 DIAGNOSIS — A41.9: Primary | ICD-10-CM

## 2022-06-29 DIAGNOSIS — E11.621: ICD-10-CM

## 2022-06-29 DIAGNOSIS — F17.210: ICD-10-CM

## 2022-06-29 DIAGNOSIS — I10: ICD-10-CM

## 2022-06-29 LAB
ALBUMIN SERPL-MCNC: 3 GM/DL (ref 3.2–4.5)
ALP SERPL-CCNC: 121 U/L (ref 40–136)
ALT SERPL-CCNC: 12 U/L (ref 0–55)
APTT BLD: 38 SEC (ref 24–35)
BASOPHILS # BLD AUTO: 0.1 10^3/UL (ref 0–0.1)
BASOPHILS NFR BLD AUTO: 0 % (ref 0–10)
BILIRUB SERPL-MCNC: 0.3 MG/DL (ref 0.1–1)
BUN/CREAT SERPL: 19
CALCIUM SERPL-MCNC: 9.1 MG/DL (ref 8.5–10.1)
CHLORIDE SERPL-SCNC: 97 MMOL/L (ref 98–107)
CO2 SERPL-SCNC: 24 MMOL/L (ref 21–32)
CREAT SERPL-MCNC: 0.84 MG/DL (ref 0.6–1.3)
EOSINOPHIL # BLD AUTO: 0.2 10^3/UL (ref 0–0.3)
EOSINOPHIL NFR BLD AUTO: 1 % (ref 0–10)
GFR SERPLBLD BASED ON 1.73 SQ M-ARVRAT: 102 ML/MIN
GLUCOSE SERPL-MCNC: 188 MG/DL (ref 70–105)
HCT VFR BLD CALC: 30 % (ref 40–54)
HGB BLD-MCNC: 9.9 G/DL (ref 13.3–17.7)
INR PPP: 1.1 (ref 0.8–1.4)
LYMPHOCYTES # BLD AUTO: 1.7 10^3/UL (ref 1–4)
LYMPHOCYTES NFR BLD AUTO: 7 % (ref 12–44)
MANUAL DIFFERENTIAL PERFORMED BLD QL: YES
MCH RBC QN AUTO: 28 PG (ref 25–34)
MCHC RBC AUTO-ENTMCNC: 33 G/DL (ref 32–36)
MCV RBC AUTO: 86 FL (ref 80–99)
MONOCYTES # BLD AUTO: 1.2 10^3/UL (ref 0–1)
MONOCYTES NFR BLD AUTO: 5 % (ref 0–12)
MONOCYTES NFR BLD: 4 %
NEUTROPHILS # BLD AUTO: 20.7 10^3/UL (ref 1.8–7.8)
NEUTROPHILS NFR BLD AUTO: 86 % (ref 42–75)
NEUTS BAND NFR BLD MANUAL: 91 %
PLATELET # BLD: 389 10^3/UL (ref 130–400)
PMV BLD AUTO: 9.8 FL (ref 9–12.2)
POTASSIUM SERPL-SCNC: 3.8 MMOL/L (ref 3.6–5)
PROT SERPL-MCNC: 7.5 GM/DL (ref 6.4–8.2)
PROTHROMBIN TIME: 14.3 SEC (ref 12.2–14.7)
RBC MORPH BLD: NORMAL
SODIUM SERPL-SCNC: 131 MMOL/L (ref 135–145)
VARIANT LYMPHS NFR BLD MANUAL: 5 %
WBC # BLD AUTO: 24 10^3/UL (ref 4.3–11)

## 2022-06-29 PROCEDURE — 71045 X-RAY EXAM CHEST 1 VIEW: CPT

## 2022-06-29 PROCEDURE — 85025 COMPLETE CBC W/AUTO DIFF WBC: CPT

## 2022-06-29 PROCEDURE — 82947 ASSAY GLUCOSE BLOOD QUANT: CPT

## 2022-06-29 PROCEDURE — 86141 C-REACTIVE PROTEIN HS: CPT

## 2022-06-29 PROCEDURE — 80053 COMPREHEN METABOLIC PANEL: CPT

## 2022-06-29 PROCEDURE — 85610 PROTHROMBIN TIME: CPT

## 2022-06-29 PROCEDURE — 36415 COLL VENOUS BLD VENIPUNCTURE: CPT

## 2022-06-29 PROCEDURE — 85027 COMPLETE CBC AUTOMATED: CPT

## 2022-06-29 PROCEDURE — 83605 ASSAY OF LACTIC ACID: CPT

## 2022-06-29 PROCEDURE — 73630 X-RAY EXAM OF FOOT: CPT

## 2022-06-29 PROCEDURE — 85007 BL SMEAR W/DIFF WBC COUNT: CPT

## 2022-06-29 PROCEDURE — 85730 THROMBOPLASTIN TIME PARTIAL: CPT

## 2022-06-29 PROCEDURE — 73610 X-RAY EXAM OF ANKLE: CPT

## 2022-06-29 PROCEDURE — 87040 BLOOD CULTURE FOR BACTERIA: CPT

## 2022-06-29 PROCEDURE — 84145 PROCALCITONIN (PCT): CPT

## 2022-06-29 PROCEDURE — 83036 HEMOGLOBIN GLYCOSYLATED A1C: CPT

## 2022-06-29 RX ADMIN — SODIUM CHLORIDE SCH MLS/HR: 900 INJECTION, SOLUTION INTRAVENOUS at 21:00

## 2022-06-29 RX ADMIN — SENNOSIDES SCH MG: 8.6 TABLET, FILM COATED ORAL at 21:00

## 2022-06-29 RX ADMIN — INSULIN ASPART SCH UNIT: 100 INJECTION, SOLUTION INTRAVENOUS; SUBCUTANEOUS at 21:00

## 2022-06-29 RX ADMIN — DOCUSATE SODIUM SCH MG: 100 CAPSULE ORAL at 21:00

## 2022-06-29 NOTE — DIAGNOSTIC IMAGING REPORT
INDICATION: ankle pain



COMPARISON: None.



FINDINGS: Three views of the right ankle were obtained. There is

no acute fracture or dislocation. No focal osseous lesions are

seen. There is moderate generalized soft tissue swelling and

edema. There are no radiopaque foreign bodies.



IMPRESSION:

1. Moderate generalized soft tissue swelling and edema of the

right ankle, but no underlying acute osseous abnormality.



Dictated by: 



  Dictated on workstation # [id number]

## 2022-06-29 NOTE — DIAGNOSTIC IMAGING REPORT
INDICATION: 

Chills.



COMPARISON: 

None.



FINDINGS: 

A single frontal view of the chest demonstrates normal heart size

and pulmonary vascularity. The lungs are well aerated and clear.

No large pleural effusion or pneumothorax is seen. The visualized

osseous structures show no acute abnormalities.



IMPRESSION: 

No acute cardiopulmonary process.  



Dictated by: 



  Dictated on workstation # [id number]

## 2022-06-29 NOTE — DIAGNOSTIC IMAGING REPORT
INDICATION: Foot pain.



COMPARISON: None available.



TECHNIQUE: Three radiographs of the right foot were obtained

dated June 29, 2022.



FINDINGS: Focal lucencies and fragmentation are noted associated

with the tuft of the 1st digit distal phalanx. Soft tissue gas is

suggested between the 1st and 2nd toe bases. No additional

fracture or dislocation. Small posterior and plantar calcaneal

enthesophytes. The Lisfranc joint is well aligned. Mild scattered

degenerative changes. Soft tissue swelling involving the mid and

forefoot. Background vascular calcifications.



IMPRESSION:

1. Fragmentation and lucencies associated with the tuft of the

1st digit distal phalanx. This may relate to osteomyelitis versus

age-indeterminate fracture. Recommend clinical correlation.

2. Mild scattered degenerative changes.

3. Soft tissue swelling involving the mid and forefoot with mild

soft tissue gas suggested between the 1st and 2nd toes. This may

relate to underlying infection or soft tissue wound. Recommend

direct visualization. 



Dictated by: 



  Dictated on workstation # [id number]
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
## 2022-06-30 VITALS — SYSTOLIC BLOOD PRESSURE: 154 MMHG | DIASTOLIC BLOOD PRESSURE: 81 MMHG

## 2022-06-30 VITALS — DIASTOLIC BLOOD PRESSURE: 62 MMHG | SYSTOLIC BLOOD PRESSURE: 139 MMHG

## 2022-06-30 LAB
ALBUMIN SERPL-MCNC: 2.7 GM/DL (ref 3.2–4.5)
ALP SERPL-CCNC: 124 U/L (ref 40–136)
ALT SERPL-CCNC: 9 U/L (ref 0–55)
BASOPHILS # BLD AUTO: 0.1 10^3/UL (ref 0–0.1)
BASOPHILS NFR BLD AUTO: 0 % (ref 0–10)
BILIRUB SERPL-MCNC: 0.2 MG/DL (ref 0.1–1)
BUN/CREAT SERPL: 15
CALCIUM SERPL-MCNC: 8.4 MG/DL (ref 8.5–10.1)
CHLORIDE SERPL-SCNC: 101 MMOL/L (ref 98–107)
CO2 SERPL-SCNC: 21 MMOL/L (ref 21–32)
CREAT SERPL-MCNC: 0.81 MG/DL (ref 0.6–1.3)
EOSINOPHIL # BLD AUTO: 0.2 10^3/UL (ref 0–0.3)
EOSINOPHIL NFR BLD AUTO: 1 % (ref 0–10)
GFR SERPLBLD BASED ON 1.73 SQ M-ARVRAT: 103 ML/MIN
GLUCOSE SERPL-MCNC: 134 MG/DL (ref 70–105)
HCT VFR BLD CALC: 30 % (ref 40–54)
HGB BLD-MCNC: 9.7 G/DL (ref 13.3–17.7)
LYMPHOCYTES # BLD AUTO: 1.6 10^3/UL (ref 1–4)
LYMPHOCYTES NFR BLD AUTO: 7 % (ref 12–44)
MANUAL DIFFERENTIAL PERFORMED BLD QL: NO
MCH RBC QN AUTO: 28 PG (ref 25–34)
MCHC RBC AUTO-ENTMCNC: 33 G/DL (ref 32–36)
MCV RBC AUTO: 86 FL (ref 80–99)
MONOCYTES # BLD AUTO: 1.3 10^3/UL (ref 0–1)
MONOCYTES NFR BLD AUTO: 6 % (ref 0–12)
NEUTROPHILS # BLD AUTO: 19.8 10^3/UL (ref 1.8–7.8)
NEUTROPHILS NFR BLD AUTO: 85 % (ref 42–75)
PLATELET # BLD: 383 10^3/UL (ref 130–400)
PMV BLD AUTO: 10.1 FL (ref 9–12.2)
POTASSIUM SERPL-SCNC: 3.9 MMOL/L (ref 3.6–5)
PROT SERPL-MCNC: 6.8 GM/DL (ref 6.4–8.2)
SODIUM SERPL-SCNC: 133 MMOL/L (ref 135–145)
WBC # BLD AUTO: 23.3 10^3/UL (ref 4.3–11)

## 2022-06-30 RX ADMIN — SENNOSIDES SCH MG: 8.6 TABLET, FILM COATED ORAL at 07:47

## 2022-06-30 RX ADMIN — SODIUM CHLORIDE SCH MLS/HR: 900 INJECTION, SOLUTION INTRAVENOUS at 03:45

## 2022-06-30 RX ADMIN — SODIUM CHLORIDE SCH MLS/HR: 900 INJECTION, SOLUTION INTRAVENOUS at 09:45

## 2022-06-30 RX ADMIN — DOCUSATE SODIUM SCH MG: 100 CAPSULE ORAL at 07:46

## 2022-06-30 RX ADMIN — INSULIN ASPART SCH UNIT: 100 INJECTION, SOLUTION INTRAVENOUS; SUBCUTANEOUS at 05:32

## 2022-06-30 NOTE — CONSULTATION - SURGERY
History of Present Illness


History of Present Illness


Patient Consulted On(kamron/time)


6/30/22


 12:24


Time Seen by Provider:  09:31


History of Present Illness


Surgery asked to consult regarding right foot abscess/cellulitis.





HPI per ED:  Mr. Barraza is a 56-year-old gentleman who presents to the emergency 

room with complaints of pain to the right foot and ankle.  He presumes this to 

be related to an injury that occurred over the weekend in which she stepped on a

metal object.  He has a break in the skin on the ball of his foot which he 

states is actually an old diabetic ulcer wound he has had for many months.  He 

reports that pus drained out of this ulcer over the weekend.  He has severe 

edema of the foot, ankle, and lower leg with hot erythema extending up to the 

proximal calf, almost to the knee.  This erythema is fairly well demarcated and 

has the appearance of cellulitis.  There are blisters on the second toe with 

dusky discoloration suspicious for skin necrosis.  Patient has type 2 diabetes 

and reports that he has not been taking any medication for his diabetes.  He is 

afebrile at present.  He reports his pain is 4 out of 10.





When I saw pt this am he ad eaten and was complaining of pain. However, he did 

not want to talk to me "I am not having any surgery".  He stated he wanted to go

to [place], "they will take better care of me there".





Allergies and Home Medications


Allergies


Uncoded Allergies:  


     THC (Allergy, Unknown, 1/13/17)





Patient Home Medication List


Home Medication List Reviewed:  Yes


Atorvastatin Calcium (Atorvastatin Calcium) 40 Mg Tablet, (Reported)


   Entered as Reported by: DILIP PRAJAPATI on 1/13/17 0844


Cephalexin (Keflex) 500 Mg Capsule, 500 MG PO TID


   Prescribed by: JEWELS GARCIA on 1/14/19 2033


Liraglutide (Victoza 3-Steve) 0.6 Mg/0.1 Ml Pen.injctr, (Reported)


   Entered as Reported by: DILIP PRAJAPATI on 1/13/17 0844


Naproxen (Naproxen) 500 Mg Tablet.dr, 500 MG PO BID


   Prescribed by: GREG JUÁREZ on 10/7/20 0039


Pregabalin (Lyrica) 75 Mg Capsule, (Reported)


   Entered as Reported by: DILIP PRAJAPATI on 1/13/17 0844


Sulfamethoxazole/Trimethoprim (Bactrim Ds Tablet) 1 Each Tablet, 1 EACH PO BID


   Prescribed by: JEWELS GARCIA on 1/14/19 2033


Tramadol HCl (Tramadol HCl) 50 Mg Tablet, (Reported)


   Entered as Reported by: DILIP YATES PRAJAPATI on 1/13/17 0844





Past Medical-Social-Family Hx


Patient Social History


Smoking Status:  Current Everyday Smoker


Type Used:  Cigarettes


Recent Hopitalizations:  No


Alcohol Use?:  Yes


Substance type:  Amphetamines, Methamphetamine, Hallucinogens, Nicotine


Have you traveled recently?:  No





Immunizations Up To Date


Tetanus Booster (TDap):  Less than 5yrs





Seasonal Allergies


Seasonal Allergies:  No





Surgeries


History of Surgeries:  Yes


Surgeries:  Abdominal (Hernia), Appendectomy, Orthopedic





Respiratory


History of Respiratory Disorde:  No





Cardiovascular


History of Cardiac Disorders:  Yes (REFUSES TO TAKE MEDICATIONS)


Cardiac Disorders:  High Cholesterol, Hypertension





Neurological


History of Neurological Disord:  No





Genitourinary


History of Genitourinary Disor:  No





Gastrointestinal


History of Gastrointestinal Di:  No





Musculoskeletal


History of Musculoskeletal Dis:  Yes


Musculoskeletal Disorders:  Chronic Back Pain





Endocrine


History of Endocrine Disorders:  Yes (NON COMPLAINT)


Endocrine Disorders:  Diabetes, Non-Insulin dep





Cancer


History of Cancer:  No





Psychosocial


History of Psychiatric Problem:  No





Integumentary


History of Skin or Integumenta:  Yes (Chronic ulcers on the anterior left ankle 

and the balls of each foot)





Family Medical History


Significant Family History:  No Pertinent Family Hx





Review of Systems-General


Constitutional:  fever, malaise


EENTM:  No blurred vision, No double vision, No mouth pain, No mouth swelling


Respiratory:  No dyspnea on exertion, No hemoptysis, No short of breath


Cardiovascular:  No chest pain, No edema


Gastrointestinal:  No abdominal pain, No nausea, No vomiting


Genitourinary:  No dysuria, No frequency, No hematuria


Musculoskeletal:  joint pain, joint swelling


Skin:  lesions, rash


Psychiatric/Neurological:  Denies Anxiety, Denies Depressed, Denies Seizure, 

Denies Tremors





Physical Exam-General Problems


Physical Exam


Vital Signs





Vital Signs - First Documented








 6/29/22 6/29/22 6/29/22 6/29/22





 16:27 20:00 20:07 20:52


 


Temp 37.6   


 


Pulse 91   


 


Resp 16   


 


B/P (MAP) 148/53 (84)   


 


Pulse Ox  94  


 


O2 Delivery   Room Air 


 


FiO2    21





Capillary Refill :


General Appearance:  moderate distress


Eyes:  Bilateral Eye PERRL, Bilateral Eye EOMI


HEENT:  No pharynx normal, No scleral icterus (R), No scleral icterus (L)


Neck:  supple


Respiratory:  lungs clear, normal breath sounds, no respiratory distress, no acc

essory muscle use


Cardiovascular:  regular rate, rhythm, no murmur


Gastrointestinal:  non tender, soft, no organomegaly


Extremities:  calf tenderness, inflammation, pedal edema, swelling, other 

(purplish/black blister on second toe and another large one on plantar/medial 

aspect of foot, erythema and edema on foot and leg.  Looks worse than picture I 

saw from ER yesterday. )





Data Review


Labs


Laboratory Tests


6/29/22 16:47: 


White Blood Count 24.0H, Red Blood Count 3.48L, Hemoglobin 9.9L, Hematocrit 30L,

Mean Corpuscular Volume 86, Mean Corpuscular Hemoglobin 28, Mean Corpuscular 

Hemoglobin Concent 33, Red Cell Distribution Width 13.0, Platelet Count 389, 

Mean Platelet Volume 9.8, Immature Granulocyte % (Auto) 1, Neutrophils (%) 

(Auto) 86H, Lymphocytes (%) (Auto) 7L, Monocytes (%) (Auto) 5, Eosinophils (%) 

(Auto) 1, Basophils (%) (Auto) 0, Neutrophils # (Auto) 20.7H, Lymphocytes # 

(Auto) 1.7, Monocytes # (Auto) 1.2H, Eosinophils # (Auto) 0.2, Basophils # 

(Auto) 0.1, Immature Granulocyte # (Auto) 0.2H, Neutrophils % (Manual) 91, 

Lymphocytes % (Manual) 5, Monocytes % (Manual) 4, Blood Morphology Comment 

NORMAL, Prothrombin Time 14.3, INR Comment 1.1, Activated Partial Thromboplast 

Time 38H, Sodium Level 131L, Potassium Level 3.8, Chloride Level 97L, Carbon 

Dioxide Level 24, Anion Gap 10, Blood Urea Nitrogen 16, Creatinine 0.84, Estimat

Glomerular Filtration Rate 102, BUN/Creatinine Ratio 19, Glucose Level 188H, L

actic Acid Level 1.18, Calcium Level 9.1, Corrected Calcium 9.9, Total Bilirubin

0.3, Aspartate Amino Transf (AST/SGOT) 13, Alanine Aminotransferase (ALT/SGPT) 

12, Alkaline Phosphatase 121, C-Reactive Protein High Sensitivity 15.24H, Total 

Protein 7.5, Albumin 3.0L, Procalcitonin 0.67H


6/29/22 20:48: Glucometer 128H


6/30/22 05:12: 


White Blood Count 23.3H, Red Blood Count 3.42L, Hemoglobin 9.7L, Hematocrit 30L,

Mean Corpuscular Volume 86, Mean Corpuscular Hemoglobin 28, Mean Corpuscular 

Hemoglobin Concent 33, Red Cell Distribution Width 13.2, Platelet Count 383, 

Mean Platelet Volume 10.1, Immature Granulocyte % (Auto) 1, Neutrophils (%) 

(Auto) 85H, Lymphocytes (%) (Auto) 7L, Monocytes (%) (Auto) 6, Eosinophils (%) 

(Auto) 1, Basophils (%) (Auto) 0, Neutrophils # (Auto) 19.8H, Lymphocytes # 

(Auto) 1.6, Monocytes # (Auto) 1.3H, Eosinophils # (Auto) 0.2, Basophils # 

(Auto) 0.1, Immature Granulocyte # (Auto) 0.3H, Sodium Level 133L, Potassium 

Level 3.9, Chloride Level 101, Carbon Dioxide Level 21, Anion Gap 11, Blood Urea

Nitrogen 12, Creatinine 0.81, Estimat Glomerular Filtration Rate 103, 

BUN/Creatinine Ratio 15, Glucose Level 134H, Calcium Level 8.4L, Corrected 

Calcium 9.4, Total Bilirubin 0.2, Aspartate Amino Transf (AST/SGOT) 12, Alanine 

Aminotransferase (ALT/SGPT) 9, Alkaline Phosphatase 124, Total Protein 6.8, 

Albumin 2.7L


6/30/22 05:44: Glucometer 126H





Radiology


Date of Exam:06/29/22





FOOT, RIGHT, 3 VIEW








INDICATION: Foot pain.





COMPARISON: None available.





TECHNIQUE: Three radiographs of the right foot were obtained


dated June 29, 2022.





FINDINGS: Focal lucencies and fragmentation are noted associated


with the tuft of the 1st digit distal phalanx. Soft tissue gas is


suggested between the 1st and 2nd toe bases. No additional


fracture or dislocation. Small posterior and plantar calcaneal


enthesophytes. The Lisfranc joint is well aligned. Mild scattered


degenerative changes. Soft tissue swelling involving the mid and


forefoot. Background vascular calcifications.





IMPRESSION:


1. Fragmentation and lucencies associated with the tuft of the


1st digit distal phalanx. This may relate to osteomyelitis versus


age-indeterminate fracture. Recommend clinical correlation.


2. Mild scattered degenerative changes.


3. Soft tissue swelling involving the mid and forefoot with mild


soft tissue gas suggested between the 1st and 2nd toes. This may


relate to underlying infection or soft tissue wound. Recommend


direct visualization. 





Dictated by: 





  Dictated on workstation # [id number]








Dict:   06/29/22 1655


Trans:   06/29/22 1703


E [id number]





Interpreted by:     CROW HANDLEY MD


Electronically signed by: CROW HANDLEY MD 06/29/22 9518





Assessment/Plan


Right foot Cellulitis/Abscess with possible Osteomyelitis





I told pt that I thought he was going to need surgery; minimum being opening 

these "blisters" to drain any purulence.  ABX will not work if there is purulent

fluid.  He stated he wasn't doing any surgery and he wasn't staying another day.

 I told him he could


lose the foot, part of leg and even die if he got septic.  He said he didn't 

care and was leaving.  He then left AMA.





Clinical Quality Measures


DVT/VTE Risk/Contraindication:


Contraindications-Mechi:  Other *list below*


Other:  


cellulitis











SUSY MEDINA DO               Jun 30, 2022 12:32

## 2022-06-30 NOTE — HISTORY & PHYSICAL-HOSPITALIST
History of Present Illness


Date Seen


6/30/22


Time Seen by a Provider:  11:00


Attending Physician


[place]


PCP


Admitting Physician:


Megan Davidson DO 








Attending Physician:


Megan Davidson DO


Referring Physician





Date of Admission


Jun 29, 2022 at 18:31





Home Medications & Allergies


Home Medications


Reviewed patient Home Medication Reconciliation performed by pharmacy medication

reconciliations technician and/or nursing.


Patients Allergies have been reviewed.





Allergies





Allergies


Uncoded Allergies


  THC ( Allergy, Unknown, 1/13/17)








Past Medical-Social-Family Hx


Patient Social History


Tobacco Use?:  Yes


Tobacco type used:  Cigarettes


Smoking Status:  Current Everyday Smoker


Use of E-Cig and/or Vaping dev:  No


Substance use?:  Yes


Substance type:  Amphetamines, Methamphetamine, Hallucinogens, Nicotine


Substance frequency:  Couple times a week


Alcohol Use?:  Yes


Alcohol type:  Beer


Alcohol Frequency:  Couple times a week


Pt feels they are or have been:  No





Immunizations Up To Date


Tetanus Booster (TDap):  Unknown





Seasonal Allergies


Seasonal Allergies:  No





Current Status


Advance Directives:  No


Communicates:  Verbally


Primary Language:  English


Preferred Spoken Language:  English


Is interpretation needed?:  No


Implanted or Applied Medical D:  None





Past Medical History


Surgeries:  Abdominal (Hernia), Appendectomy, Orthopedic


High Cholesterol, Hypertension


Chronic Back Pain


Diabetes, Non-Insulin dep





Family Medical History





SOCIAL HISTORY:


-OCCASIONAL ETOH USE


-EXTENSIVE DRUG ABUSE, INCLUDING IV COCAINE AND METH, ALSO THC, MUSHROOMS,


"EVERYTHING"


-SMOKES 1 PPD





Review of Systems


ROS-Unable to Obtain:  left ama


Constitutional:  no symptoms reported





Physical Exam


Physical Exam


Vital Signs





Vital Signs - First Documented








 6/29/22 6/29/22 6/29/22 6/29/22





 16:27 20:00 20:07 20:52


 


Temp 37.6   


 


Pulse 91   


 


Resp 16   


 


B/P (MAP) 148/53 (84)   


 


Pulse Ox  94  


 


O2 Delivery   Room Air 


 


FiO2    21





Capillary Refill :


Height, Weight, BMI


Height: 5'10.00"


Weight: 230lbs. oz. 104.049026ll; 28.24 BMI


Method:Stated


General Appearance:  Other (left ama)





Results


Results/Procedures


Labs


Laboratory Tests


6/29/22 16:47








6/30/22 05:12








Patient resulted labs reviewed.





Assessment/Plan


Admission Diagnosis


left ama


Admission Status:  Observation


Reason for Inpatient Admission:  


ama





Clinical Quality Measures


DVT/VTE Risk/Contraindication:


Contraindications-Mechi:  Other *list below*


Other:  


cellulitis











MEGAN DAVIDSON DO                Jun 30, 2022 05:58
Discharged

## 2022-06-30 NOTE — DISCHARGE SUMMARY
Discharge Summary


Hospital Course


Was the Problem List Reviewed?:  Yes


Problems/Dx:  


(1) Cellulitis of right lower extremity


Status:  Acute


(2) Sepsis


Status:  Acute


Qualifiers:  


   Qualified Codes:  A41.9 - Sepsis, unspecified organism


Hospital Course


Date of Admission: Jun 29, 2022 at 18:31 


Admission Diagnosis :  





Family Physician/Provider: [facility]  





Date of Discharge: 6/30/22 


Discharge Diagnosis: [ ]








Hospital Course:


[ ]














Labs and Pending Lab Test:


Laboratory Tests


6/29/22 16:47: 


White Blood Count 24.0H, Red Blood Count 3.48L, Hemoglobin 9.9L, Hematocrit 30L,

Mean Corpuscular Volume 86, Mean Corpuscular Hemoglobin 28, Mean Corpuscular 

Hemoglobin Concent 33, Red Cell Distribution Width 13.0, Platelet Count 389, 

Mean Platelet Volume 9.8, Immature Granulocyte % (Auto) 1, Neutrophils (%) 

(Auto) 86H, Lymphocytes (%) (Auto) 7L, Monocytes (%) (Auto) 5, Eosinophils (%) 

(Auto) 1, Basophils (%) (Auto) 0, Neutrophils # (Auto) 20.7H, Lymphocytes # 

(Auto) 1.7, Monocytes # (Auto) 1.2H, Eosinophils # (Auto) 0.2, Basophils # 

(Auto) 0.1, Immature Granulocyte # (Auto) 0.2H, Neutrophils % (Manual) 91, 

Lymphocytes % (Manual) 5, Monocytes % (Manual) 4, Blood Morphology Comment 

NORMAL, Prothrombin Time 14.3, INR Comment 1.1, Activated Partial Thromboplast 

Time 38H, Sodium Level 131L, Potassium Level 3.8, Chloride Level 97L, Carbon 

Dioxide Level 24, Anion Gap 10, Blood Urea Nitrogen 16, Creatinine 0.84, Estimat

Glomerular Filtration Rate 102, BUN/Creatinine Ratio 19, Glucose Level 188H, 

Mean Blood Glucose [Pending], Hemoglobin A1c [Pending], Lactic Acid Level 1.18, 

Calcium Level 9.1, Corrected Calcium 9.9, Total Bilirubin 0.3, Aspartate Amino 

Transf (AST/SGOT) 13, Alanine Aminotransferase (ALT/SGPT) 12, Alkaline 

Phosphatase 121, C-Reactive Protein High Sensitivity 15.24H, Total Protein 7.5, 

Albumin 3.0L, Procalcitonin 0.67H


6/29/22 20:48: Glucometer 128H


6/30/22 05:12: 


White Blood Count 23.3H, Red Blood Count 3.42L, Hemoglobin 9.7L, Hematocrit 30L,

 Mean Corpuscular Volume 86, Mean Corpuscular Hemoglobin 28, Mean Corpuscular 

Hemoglobin Concent 33, Red Cell Distribution Width 13.2, Platelet Count 383, 

Mean Platelet Volume 10.1, Immature Granulocyte % (Auto) 1, Neutrophils (%) 

(Auto) 85H, Lymphocytes (%) (Auto) 7L, Monocytes (%) (Auto) 6, Eosinophils (%) 

(Auto) 1, Basophils (%) (Auto) 0, Neutrophils # (Auto) 19.8H, Lymphocytes # 

(Auto) 1.6, Monocytes # (Auto) 1.3H, Eosinophils # (Auto) 0.2, Basophils # 

(Auto) 0.1, Immature Granulocyte # (Auto) 0.3H, Sodium Level 133L, Potassium 

Level 3.9, Chloride Level 101, Carbon Dioxide Level 21, Anion Gap 11, Blood Urea

 Nitrogen 12, Creatinine 0.81, Estimat Glomerular Filtration Rate 103, 

BUN/Creatinine Ratio 15, Glucose Level 134H, Calcium Level 8.4L, Corrected 

Calcium 9.4, Total Bilirubin 0.2, Aspartate Amino Transf (AST/SGOT) 12, Alanine 

Aminotransferase (ALT/SGPT) 9, Alkaline Phosphatase 124, Total Protein 6.8, 

Albumin 2.7L


6/30/22 05:44: Glucometer 126H





Home Meds


Active


Naproxen 500 Mg Tablet.dr 500 Mg PO BID


Keflex (Cephalexin) 500 Mg Capsule 500 Mg PO TID


Bactrim Ds Tablet (Sulfamethoxazole/Trimethoprim) 1 Each Tablet 1 Each PO BID


Reported


Lyrica (Pregabalin) 75 Mg Capsule   


Atorvastatin Calcium 40 Mg Tablet   


Victoza 3-Steve (Liraglutide) 0.6 Mg/0.1 Ml Pen.injctr   


Tramadol HCl 50 Mg Tablet


Assessment/Pt Instructions


left ama


Discharge Planning:  <30 minutes discharge planning





Discharge Physical Examination


Vital Signs





Vital Signs








  Date Time  Temp Pulse Resp B/P (MAP) Pulse Ox O2 Delivery O2 Flow Rate FiO2


 


6/30/22 10:18 38.2 89 18 139/62 95 Room Air  


 


6/29/22 20:52        21








Allergies:  


Uncoded Allergies:  


     THC (Allergy, Unknown, 1/13/17)





Discharge Summary


Date of Admission


Jun 29, 2022 at 18:31


Date of Discharge








Clinical Quality Measures


DVT/VTE Risk/Contraindication:


Contraindications-Mechi:  Other *list below*


Other:  


cellulitis











DESHAWN PATEL DO                Jun 30, 2022 10:25

## 2022-12-18 ENCOUNTER — HOSPITAL ENCOUNTER (EMERGENCY)
Dept: HOSPITAL 75 - ER | Age: 57
Discharge: HOME | End: 2022-12-18
Payer: SELF-PAY

## 2022-12-18 VITALS — WEIGHT: 195.99 LBS | HEIGHT: 68.98 IN | BODY MASS INDEX: 29.03 KG/M2

## 2022-12-18 VITALS — DIASTOLIC BLOOD PRESSURE: 90 MMHG | SYSTOLIC BLOOD PRESSURE: 174 MMHG

## 2022-12-18 DIAGNOSIS — Z28.310: ICD-10-CM

## 2022-12-18 DIAGNOSIS — M25.551: Primary | ICD-10-CM

## 2022-12-18 DIAGNOSIS — F17.210: ICD-10-CM
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
## 2022-12-18 DIAGNOSIS — M79.671: ICD-10-CM

## 2022-12-18 PROCEDURE — 73630 X-RAY EXAM OF FOOT: CPT

## 2022-12-18 NOTE — DIAGNOSTIC IMAGING REPORT
INDICATION: Pain, fall.



COMPARISON: 06/29/2022. 



TECHNIQUE: Three radiographs of the right foot dated December 18, 2022.



FINDINGS: No acute fracture or dislocation. No destructive

osseous process. Mild scattered degenerative changes. Lisfranc

joint is well aligned. No suspicious radiopaque foreign body.

Small posterior calcaneal enthesophytes. Significant background

vascular calcifications.



IMPRESSION: No acute osseous abnormality with mild degenerative

changes and significant background vascular calcifications. 



Dictated by: 



  Dictated on workstation # [id number]

## 2022-12-18 NOTE — DIAGNOSTIC IMAGING REPORT
Indication: Fall with right hip pain.



Comparison: None.



Discussion: AP view of the pelvis and two views of the right hip

were obtained. Intramedullary queenie is incompletely viewed within

the right femur. No hardware complication within the visualized

component. Hip joints are maintained. No fracture or dislocation.

Soft tissues are unremarkable.



Impression: No acute abnormality within the pelvis or right hip.

 



Dictated by: 



  Dictated on workstation # [id number]

## 2022-12-18 NOTE — ED LOWER EXTREMITY
General


Chief Complaint:  Lower Extremity


Stated Complaint:  FALL RIGHT HIP/FOOT INJURY


Nursing Triage Note:  


PT ARRIVED POV WITH COMPLAINTS OF RIGHT HIP AND FOOT PAIN. PT STATED THAT HE 


LOST HIS BALANCE YESTERDAY AND FELL.


Source:  patient


Exam Limitations:  no limitations


 (JOHAN PASTOR)





History of Present Illness


Date Seen by Provider:  Dec 18, 2022


Time Seen by Provider:  16:00


Initial Comments


Patient is a 57-year-old male who presents to the emergency department with 

right hip and foot pain that began yesterday when he lost his balance and fell o

n his right side.  Patient states he has been nail in his right femur.  States 

he was told that the nail may have "moved a little".  Patient denies any other 

pain or injury.  Patient was ambulatory to the room.  He has not take anything 

today for the symptoms.


 (JOHAN PASTOR)





Allergies and Home Medications


Allergies


Uncoded Allergies:  


     THC (Allergy, Unknown, 1/13/17)





Patient Home Medication List


Home Medication List Reviewed:  Yes


 (JOHAN PASTOR)


Atorvastatin Calcium (Atorvastatin Calcium) 40 Mg Tablet, (Reported)


   Entered as Reported by: DILIP PRAJAPATI on 1/13/17 0844


Cephalexin (Keflex) 500 Mg Capsule, 500 MG PO TID


   Prescribed by: JEWELS GARCIA on 1/14/19 2033


Liraglutide (Victoza 3-Steve) 0.6 Mg/0.1 Ml Pen.injctr, (Reported)


   Entered as Reported by: DILIP PRAJAPATI on 1/13/17 0844


Naproxen (Naproxen) 500 Mg Tablet.dr, 500 MG PO BID


   Prescribed by: GREG JUÁREZ on 10/7/20 0039


Pregabalin (Lyrica) 75 Mg Capsule, (Reported)


   Entered as Reported by: DILIP PRAJAPATI on 1/13/17 0844


Sulfamethoxazole/Trimethoprim (Bactrim Ds Tablet) 1 Each Tablet, 1 EACH PO BID


   Prescribed by: JEWELS GARCIA on 1/14/19 2033


Tramadol HCl (Tramadol HCl) 50 Mg Tablet, (Reported)


   Entered as Reported by: DILIP PRAJAPATI on 1/13/17 0844





Review of Systems


Constitutional:  no symptoms reported


EENTM:  no symptoms reported


Respiratory:  no symptoms reported


Cardiovascular:  no symptoms reported


Gastrointestinal:  no symptoms reported


Genitourinary:  no symptoms reported


Musculoskeletal:  see HPI, joint pain


Skin:  no symptoms reported


Psychiatric/Neurological:  No Symptoms Reported (JOHAN PASTOR)





Past Medical-Social-Family Hx


Patient Social History


Tobacco Use?:  No


Substance use?:  No


Alcohol Use?:  No


 (JOHAN PASTOR)





Immunizations Up To Date


Tetanus Booster (TDap):  Less than 5yrs


 (JOHAN PASTOR)





Seasonal Allergies


Seasonal Allergies:  No


 (JOHAN PASTOR)





Past Medical History


Surgery/Hospitalization HX:  


appy, ortho sx, hernia repair


Surgeries:  Yes


Abdominal, Appendectomy, Orthopedic


Respiratory:  No


Cardiac:  Yes (REFUSES TO TAKE MEDICATIONS)


High Cholesterol, Hypertension


Neurological:  No


Genitourinary:  No


Gastrointestinal:  No


Musculoskeletal:  Yes


Chronic Back Pain


Endocrine:  Yes (NON COMPLAINT)


Diabetes, Non-Insulin dep


Cancer:  No


Psychosocial:  No


Integumentary:  Yes (Chronic ulcers on the anterior left ankle and the balls of 

each foot)


 (JOHAN PASTOR)





Family Medical History


No Pertinent Family Hx





SOCIAL HISTORY:


-OCCASIONAL ETOH USE


-EXTENSIVE DRUG ABUSE, INCLUDING IV COCAINE AND METH, ALSO THC, MUSHROOMS,


"EVERYTHING"


-SMOKES 1 PPD


 (JOHAN PASTOR)





Physical Exam


Vital Signs





Vital Signs - First Documented








 12/18/22 12/18/22





 15:58 17:00


 


Pulse 85 


 


Resp  18


 


B/P (MAP) 174/90 (118) 


 


Pulse Ox 99 


 


O2 Delivery Room Air 








 (NATALIA HARPER MD)


Vital Signs


Capillary Refill :  


 (JOHAN PASTOR)


Height, Weight, BMI


Height: 5'10.00"


Weight: 230lbs. oz. 104.009723vx; 28.00 BMI


Method:Stated


General Appearance:  WD/WN, no apparent distress


HEENT:  PERRL/EOMI, normal ENT inspection, TMs normal, pharynx normal


Neck:  non-tender, full range of motion, supple, normal inspection


Cardiovascular:  regular rate, rhythm


Respiratory:  chest non-tender, lungs clear, normal breath sounds, no 

respiratory distress, no accessory muscle use


Gastrointestinal:  normal bowel sounds, non tender, soft


Hips:  right hip pain, right hip soft tissue tenderness


Feet:  right foot normal inspection, right foot pain, right foot soft tissue 

tenderness


Neurologic/Psychiatric:  no motor/sensory deficits, alert, normal mood/affect, 

oriented x 3


Skin:  normal color, warm/dry (JOHAN PASTOR)





Progress/Results/Core Measures


Results/Orders


Vital Signs/I&O











 12/18/22 12/18/22





 15:58 17:00


 


Pulse 85 85


 


Resp  18


 


B/P (MAP) 174/90 (118) 174/90


 


Pulse Ox 99 99


 


O2 Delivery Room Air Room Air





 (NATALIA HARPER MD)








Blood Pressure Mean:                    118











Progress


Progress Note :  


Progress Note


Patient is nontoxic and well-hydrated on exam.  No obvious deformity noted to 

the right hip or right foot.  Patient has intact neurovascular function in the 

distal right lower extremity.  X-rays of the right hip and right foot are 

acutely negative.  Patient was ambulatory on the affected extremity.  Will 

discharge home with recommendations for supportive care and close follow-up with

PCP.  Return precautions for symptomology discussed.  Patient verbalized 

understanding.


 (JOHAN PASTOR)





Departure


Impression





   Primary Impression:  


   Right hip pain


   Additional Impression:  


   Right foot pain


Disposition:  01 HOME, SELF-CARE


Condition:  Stable





Departure-Patient Inst.


Decision time for Depature:  16:50


 (JOHAN PASTOR)


Referrals:  


[facility] [place] (PCP/Family)


Primary Care Physician


Patient Instructions:  Hip Pain ED, Foot Sprain ED








ATTENDING PHYSICIAN NOTE:


I was physically present as attending physician in the emergency department 

during the care of this patient, but I was not directly involved in the decision

making or delivery of care for this patient.


 (NATALIA HARPER MD)











JOHAN PASTOR             Dec 18, 2022 16:25


NATALIA HARPER MD        Dec 20, 2022 05:06

## 2023-07-01 ENCOUNTER — HOSPITAL ENCOUNTER (EMERGENCY)
Dept: HOSPITAL 75 - ER | Age: 58
Discharge: HOME | End: 2023-07-01
Payer: COMMERCIAL

## 2023-07-01 VITALS — BODY MASS INDEX: 29.39 KG/M2 | WEIGHT: 198.42 LBS | HEIGHT: 68.9 IN

## 2023-07-01 VITALS — DIASTOLIC BLOOD PRESSURE: 101 MMHG | SYSTOLIC BLOOD PRESSURE: 169 MMHG

## 2023-07-01 DIAGNOSIS — D72.829: ICD-10-CM

## 2023-07-01 DIAGNOSIS — I10: ICD-10-CM

## 2023-07-01 DIAGNOSIS — E11.621: Primary | ICD-10-CM

## 2023-07-01 DIAGNOSIS — F17.210: ICD-10-CM

## 2023-07-01 DIAGNOSIS — R79.82: ICD-10-CM

## 2023-07-01 DIAGNOSIS — Z20.822: ICD-10-CM

## 2023-07-01 LAB
ALBUMIN SERPL-MCNC: 3 GM/DL (ref 3.2–4.5)
ALP SERPL-CCNC: 87 U/L (ref 40–136)
ALT SERPL-CCNC: 11 U/L (ref 0–55)
APTT PPP: YELLOW S
BACTERIA #/AREA URNS HPF: (no result) /HPF
BARBITURATES UR QL: NEGATIVE
BASOPHILS # BLD AUTO: 0.1 10^3/UL (ref 0–0.1)
BASOPHILS NFR BLD AUTO: 1 % (ref 0–10)
BASOPHILS NFR BLD MANUAL: 1 %
BENZODIAZ UR QL SCN: NEGATIVE
BILIRUB SERPL-MCNC: 0.4 MG/DL (ref 0.1–1)
BILIRUB UR QL STRIP: NEGATIVE
BUN/CREAT SERPL: 17
CALCIUM SERPL-MCNC: 8.5 MG/DL (ref 8.5–10.1)
CHLORIDE SERPL-SCNC: 106 MMOL/L (ref 98–107)
CK SERPL-CCNC: 119 U/L (ref 30–200)
CO2 SERPL-SCNC: 18 MMOL/L (ref 21–32)
COCAINE UR QL: NEGATIVE
CREAT SERPL-MCNC: 1.33 MG/DL (ref 0.6–1.3)
EOSINOPHIL # BLD AUTO: 0.2 10^3/UL (ref 0–0.3)
EOSINOPHIL NFR BLD AUTO: 1 % (ref 0–10)
EOSINOPHIL NFR BLD MANUAL: 2 %
FIBRINOGEN PPP-MCNC: (no result) MG/DL
GFR SERPLBLD BASED ON 1.73 SQ M-ARVRAT: 62 ML/MIN
GLUCOSE SERPL-MCNC: 140 MG/DL (ref 70–105)
GLUCOSE UR STRIP-MCNC: (no result) MG/DL
HCT VFR BLD CALC: 38 % (ref 40–54)
HGB BLD-MCNC: 12.5 G/DL (ref 13.3–17.7)
KETONES UR QL STRIP: NEGATIVE
LEUKOCYTE ESTERASE UR QL STRIP: NEGATIVE
LIPASE SERPL-CCNC: 49 U/L (ref 8–78)
LYMPHOCYTES # BLD AUTO: 1.1 10^3/UL (ref 1–4)
LYMPHOCYTES NFR BLD AUTO: 6 % (ref 12–44)
MAGNESIUM SERPL-MCNC: 1.7 MG/DL (ref 1.6–2.4)
MANUAL DIFFERENTIAL PERFORMED BLD QL: YES
MCH RBC QN AUTO: 29 PG (ref 25–34)
MCHC RBC AUTO-ENTMCNC: 33 G/DL (ref 32–36)
MCV RBC AUTO: 88 FL (ref 80–99)
METHADONE UR QL SCN: NEGATIVE
MONOCYTES # BLD AUTO: 0.9 10^3/UL (ref 0–1)
MONOCYTES NFR BLD AUTO: 5 % (ref 0–12)
MONOCYTES NFR BLD: 3 %
NEUTROPHILS # BLD AUTO: 17.5 10^3/UL (ref 1.8–7.8)
NEUTROPHILS NFR BLD AUTO: 88 % (ref 42–75)
NEUTS BAND NFR BLD MANUAL: 87 %
NITRITE UR QL STRIP: NEGATIVE
OPIATES UR QL SCN: NEGATIVE
OXYCODONE UR QL: NEGATIVE
PH UR STRIP: 6 [PH] (ref 5–9)
PLATELET # BLD: 332 10^3/UL (ref 130–400)
PMV BLD AUTO: 10 FL (ref 9–12.2)
POTASSIUM SERPL-SCNC: 4.7 MMOL/L (ref 3.6–5)
PROPOXYPH UR QL: NEGATIVE
PROT SERPL-MCNC: 6.9 GM/DL (ref 6.4–8.2)
PROT UR QL STRIP: (no result)
RBC #/AREA URNS HPF: (no result) /HPF
RBC MORPH BLD: NORMAL
SODIUM SERPL-SCNC: 134 MMOL/L (ref 135–145)
SP GR UR STRIP: >=1.03 (ref 1.02–1.02)
TRICYCLICS UR QL SCN: NEGATIVE
VARIANT LYMPHS NFR BLD MANUAL: 7 %
WBC # BLD AUTO: 19.8 10^3/UL (ref 4.3–11)
WBC #/AREA URNS HPF: (no result) /HPF

## 2023-07-01 PROCEDURE — 82550 ASSAY OF CK (CPK): CPT

## 2023-07-01 PROCEDURE — 86141 C-REACTIVE PROTEIN HS: CPT

## 2023-07-01 PROCEDURE — 85027 COMPLETE CBC AUTOMATED: CPT

## 2023-07-01 PROCEDURE — 87040 BLOOD CULTURE FOR BACTERIA: CPT

## 2023-07-01 PROCEDURE — 82947 ASSAY GLUCOSE BLOOD QUANT: CPT

## 2023-07-01 PROCEDURE — 80053 COMPREHEN METABOLIC PANEL: CPT

## 2023-07-01 PROCEDURE — 83690 ASSAY OF LIPASE: CPT

## 2023-07-01 PROCEDURE — 80306 DRUG TEST PRSMV INSTRMNT: CPT

## 2023-07-01 PROCEDURE — 81000 URINALYSIS NONAUTO W/SCOPE: CPT

## 2023-07-01 PROCEDURE — 83735 ASSAY OF MAGNESIUM: CPT

## 2023-07-01 PROCEDURE — 87636 SARSCOV2 & INF A&B AMP PRB: CPT

## 2023-07-01 PROCEDURE — 36415 COLL VENOUS BLD VENIPUNCTURE: CPT

## 2023-07-01 PROCEDURE — 73630 X-RAY EXAM OF FOOT: CPT

## 2023-07-01 PROCEDURE — 83605 ASSAY OF LACTIC ACID: CPT

## 2023-07-01 PROCEDURE — 85007 BL SMEAR W/DIFF WBC COUNT: CPT

## 2023-07-01 NOTE — DIAGNOSTIC IMAGING REPORT
EXAMINATION: Right foot radiographs, 3 views.



COMPARISON: December 18, 2022. 



HISTORY: 57-year-old male, right plantar foot pain. 



FINDINGS: There is degenerative type calcaneal enthesopathy.

There are vascular calcifications. There is no identified acute

fracture. The tarsometatarsal articulations are not particularly

well evaluated. The additional joint spaces appear

well-preserved. There is no cortical or aggressive bone

destruction. 



IMPRESSION: 

1. No identified acute bony abnormality of the right foot.

2. Degenerative type calcaneal enthesopathy. 



Dictated by: 



  Dictated on workstation # [id number]

## 2023-07-01 NOTE — ED GENERAL
General


Chief Complaint:  General Problems/Pain


Stated Complaint:  SHAKING/LOW BLOOD PRESSURE


Source of Information:  Patient


Exam Limitations:  No Limitations





History of Present Illness


Date Seen by Provider:  Jul 1, 2023


Time Seen by Provider:  16:14


Initial Comments


Patient is a 57-year-old male who presents ED with not feeling well, shaking and

low blood pressure.  Patient woke up around 2:30 PM.  Patient states he woke up 

not feeling good.  Started having diffuse shaking.  Patient states he is type II

diabetic not on insulin.   Had a blood pressure in the 90s systolic at home 

today.  States he has been working a lot at home cleaning up his yard for the 

[employer].  Patient did not work this morning.  He has been working the past few 

days.  Denies of any vomiting, diarrhea, cough, shortness of breath, back pain, 

chest pain, night sweats, weight loss, lower extremity weakness or sensory 

changes, chest pain, abdominal pain, nausea, vomiting, diarrhea,  headache.  

States he has been feeling lightheaded over the past few days.  Denies of any 

unilateral muscle weakness or sensory changes.  Patient states he has swelling 

to his left elbow 2 weeks ago after hitting his elbow.  History of hitting his 

elbow resulting in olecranon bursitis.  Also reports chronic sores.  Denies any 

drug use or alcohol use. Reports a wound to his right foot for several months.  

History of osteomyelitis.





Allergies and Home Medications


Allergies


Uncoded Allergies:  


     THC (Allergy, Unknown, 1/13/17)





Patient Home Medication List


Home Medication List Reviewed:  Yes


Atorvastatin Calcium (Atorvastatin Calcium) 40 Mg Tablet, (Reported)


   Entered as Reported by: DILIP PRAJAPATI on 1/13/17 0844


Cephalexin (Keflex) 500 Mg Capsule, 500 MG PO TID


   Prescribed by: JEWELS GARCIA on 1/14/19 2033


Cephalexin (Cephalexin) 500 Mg Tablet, 500 MG PO QID


   Prescribed by: LALI GRIJALVA on 7/1/23 1832


Liraglutide (Victoza 3-Steve) 0.6 Mg/0.1 Ml Pen.injctr, (Reported)


   Entered as Reported by: DILIP PRAJAPATI on 1/13/17 0844


Naproxen (Naproxen) 500 Mg Tablet.dr, 500 MG PO BID


   Prescribed by: GREG JUÁREZ on 10/7/20 0039


Pregabalin (Lyrica) 75 Mg Capsule, (Reported)


   Entered as Reported by: DILIP PRAJAPATI on 1/13/17 0844


Sulfamethoxazole/Trimethoprim (Bactrim Ds Tablet) 1 Each Tablet, 1 EACH PO BID


   Prescribed by: JEWELS GARCIA on 1/14/19 2033


Sulfamethoxazole/Trimethoprim (Bactrim Ds Tablet) 800 Mg-160 Mg Tablet, 1 EACH 

PO BID


   Prescribed by: LALI GRIJALVA on 7/1/23 1832


Tramadol HCl (Tramadol HCl) 50 Mg Tablet, (Reported)


   Entered as Reported by: DILIP PRAJAPATI on 1/13/17 0844





Review of Systems


Review of Systems


Constitutional:  No chills, No diaphoresis; dizziness; No fever; malaise, 

weakness


EENTM:  No ear pain, No blurred vision, No double vision


Respiratory:  No cough, No dyspnea on exertion


Cardiovascular:  No chest pain, No edema


Gastrointestinal:  No abdominal pain, No diarrhea, No nausea, No vomiting


Genitourinary:  No decreased output, No discharge, No dysuria, No frequency, No 

hematuria


Musculoskeletal:  back pain, joint pain, joint swelling; No muscle pain


Skin:  No change in color, No change in hair/nails





All Other Systems Reviewed


Negative Unless Noted:  Yes





Past Medical-Social-Family Hx


Immunizations Up To Date


Tetanus Booster (TDap):  Less than 5yrs





Seasonal Allergies


Seasonal Allergies:  No





Past Medical History


Surgery/Hospitalization HX:  


appy, ortho sx, hernia repair


Surgeries:  Yes


Abdominal, Appendectomy, Orthopedic


Respiratory:  No


Cardiac:  Yes (REFUSES TO TAKE MEDICATIONS)


High Cholesterol, Hypertension


Neurological:  No


Genitourinary:  No


Gastrointestinal:  No


Musculoskeletal:  Yes


Chronic Back Pain


Endocrine:  Yes (NON COMPLAINT)


Diabetes, Non-Insulin dep


Cancer:  No


Psychosocial:  No


Integumentary:  Yes (Chronic ulcers on the anterior left ankle and the balls of 

each foot)





Family Medical History


No Pertinent Family Hx





SOCIAL HISTORY:


-OCCASIONAL ETOH USE


-EXTENSIVE DRUG ABUSE, INCLUDING IV COCAINE AND METH, ALSO THC, MUSHROOMS,


"EVERYTHING"


-SMOKES 1 PPD





Physical Exam


Vital Signs





Vital Signs - First Documented








 7/1/23





 16:04


 


Temp 37.4


 


Pulse 88


 


Resp 20


 


B/P (MAP) 182/91 (121)


 


Pulse Ox 99


 


O2 Delivery Room Air





Capillary Refill : Less Than 3 Seconds


Height, Weight, BMI


Height: 5'10.00"


Weight: 230lbs. oz. [phone]; 28.00 BMI


Method:Stated


General Appearance:  No Apparent Distress, WD/WN


Eyes:  Bilateral Eye Normal Inspection, Bilateral Eye PERRL, Bilateral Eye EOMI


HEENT:  PERRL/EOMI, TMs Normal, Normal ENT Inspection, Pharynx Normal


Neck:  Full Range of Motion, Normal Inspection, Non Tender, Supple


Respiratory:  Chest Non Tender, Lungs Clear, Normal Breath Sounds, No Accessory 

Muscle Use, No Respiratory Distress


Cardiovascular:  Regular Rate, Rhythm, No Edema, No Gallop, No JVD


Gastrointestinal:  Normal Bowel Sounds, No Organomegaly


Back:  Normal Inspection, No CVA Tenderness


Extremity:  Normal Capillary Refill, Normal Range of Motion, Non Tender, Other 

(Olecranon bursitis left elbow.  Normal active range of motion.  No erythema or 

warmth.  Mild ecchymosis.  Ulcer right plantar foot.  No surrounding redness or 

swelling.  Tenderness to palpate.)


Neurologic/Psychiatric:  Alert, Oriented x3, No Motor/Sensory Deficits, Normal 

Mood/Affect, CNs II-XII Norm as Tested


Skin:  Warm/Dry





Focused Exam


Lactate Level


7/1/23 17:22: Lactic Acid Level 1.10





Lactic Acid Level





Laboratory Tests








Test


 7/1/23


17:22


 


Lactic Acid Level


 1.10 MMOL/L


(0.50-2.00)











Progress/Results/Core Measures


Suspected Sepsis


SIRS


Temperature: 


Pulse:  


Respiratory Rate: 


 


Laboratory Tests


7/1/23 16:41: White Blood Count 19.8H


Blood Pressure  / 


Mean: 


 





7/1/23 17:22: Lactic Acid Level 1.10


Laboratory Tests


7/1/23 16:41: 


Creatinine 1.33H, Platelet Count 332, Total Bilirubin 0.4








Results/Orders


Lab Results





Laboratory Tests








Test


 7/1/23


16:19 7/1/23


16:22 7/1/23


16:41 7/1/23


17:22 Range/Units


 


 


Glucometer 146 H      MG/DL


 


Influenza Type A (RT-PCR)  Not Detected    Not Detecte  


 


Influenza Type B (RT-PCR)  Not Detected    Not Detecte  


 


SARS-CoV-2 RNA (RT-PCR)  Not Detected    Not Detecte  


 


White Blood Count


 


 


 19.8 H


 


 4.3-11.0


10^3/uL


 


Red Blood Count


 


 


 4.34 


 


 4.30-5.52


10^6/uL


 


Hemoglobin   12.5 L  13.3-17.7  g/dL


 


Hematocrit   38 L  40-54  %


 


Mean Corpuscular Volume   88   80-99  fL


 


Mean Corpuscular Hemoglobin   29   25-34  pg


 


Mean Corpuscular Hemoglobin


Concent 


 


 33 


 


 32-36  g/dL





 


Red Cell Distribution Width   12.6   10.0-14.5  %


 


Platelet Count


 


 


 332 


 


 130-400


10^3/uL


 


Mean Platelet Volume   10.0   9.0-12.2  fL


 


Immature Granulocyte % (Auto)   1    %


 


Neutrophils (%) (Auto)   88 H  42-75  %


 


Lymphocytes (%) (Auto)   6 L  12-44  %


 


Monocytes (%) (Auto)   5   0-12  %


 


Eosinophils (%) (Auto)   1   0-10  %


 


Basophils (%) (Auto)   1   0-10  %


 


Neutrophils # (Auto)


 


 


 17.5 H


 


 1.8-7.8


10^3/uL


 


Lymphocytes # (Auto)


 


 


 1.1 


 


 1.0-4.0


10^3/uL


 


Monocytes # (Auto)


 


 


 0.9 


 


 0.0-1.0


10^3/uL


 


Eosinophils # (Auto)


 


 


 0.2 


 


 0.0-0.3


10^3/uL


 


Basophils # (Auto)


 


 


 0.1 


 


 0.0-0.1


10^3/uL


 


Immature Granulocyte # (Auto)


 


 


 0.1 


 


 0.0-0.1


10^3/uL


 


Neutrophils % (Manual)   87    %


 


Lymphocytes % (Manual)   7    %


 


Monocytes % (Manual)   3    %


 


Eosinophils % (Manual)   2    %


 


Basophils % (Manual)   1    %


 


Blood Morphology Comment   NORMAL    


 


Sodium Level   134 L  135-145  MMOL/L


 


Potassium Level   4.7   3.6-5.0  MMOL/L


 


Chloride Level   106     MMOL/L


 


Carbon Dioxide Level   18 L  21-32  MMOL/L


 


Anion Gap   10   5-14  MMOL/L


 


Blood Urea Nitrogen   22 H  7-18  MG/DL


 


Creatinine


 


 


 1.33 H


 


 0.60-1.30


MG/DL


 


Estimat Glomerular Filtration


Rate 


 


 62 


 


  





 


BUN/Creatinine Ratio   17    


 


Glucose Level   140 H    MG/DL


 


Calcium Level   8.5   8.5-10.1  MG/DL


 


Corrected Calcium   9.3   8.5-10.1  MG/DL


 


Magnesium Level   1.7   1.6-2.4  MG/DL


 


Total Bilirubin   0.4   0.1-1.0  MG/DL


 


Aspartate Amino Transf


(AST/SGOT) 


 


 14 


 


 5-34  U/L





 


Alanine Aminotransferase


(ALT/SGPT) 


 


 11 


 


 0-55  U/L





 


Alkaline Phosphatase   87     U/L


 


Total Creatine Kinase   119     U/L


 


C-Reactive Protein High


Sensitivity 


 


 1.68 H


 


 0.00-0.50


MG/DL


 


Total Protein   6.9   6.4-8.2  GM/DL


 


Albumin   3.0 L  3.2-4.5  GM/DL


 


Lipase   49   8-78  U/L


 


Lactic Acid Level


 


 


 


 1.10 


 0.50-2.00


MMOL/L


 


Test


 7/1/23


18:10 


 


 


 Range/Units


 


 


Urine Color YELLOW      


 


Urine Clarity SL CLOUDY      


 


Urine pH 6.0     5-9  


 


Urine Specific Gravity >=1.030     1.016-1.022  


 


Urine Protein 2+ H    NEGATIVE  


 


Urine Glucose (UA) TRACE H    NEGATIVE  


 


Urine Ketones NEGATIVE     NEGATIVE  


 


Urine Nitrite NEGATIVE     NEGATIVE  


 


Urine Bilirubin NEGATIVE     NEGATIVE  


 


Urine Urobilinogen 0.2     < = 1.0  MG/DL


 


Urine Leukocyte Esterase NEGATIVE     NEGATIVE  


 


Urine RBC (Auto) 1+ H    NEGATIVE  


 


Urine RBC NONE      /HPF


 


Urine WBC RARE      /HPF


 


Urine Crystals NONE      /LPF


 


Urine Bacteria TRACE      /HPF


 


Urine Casts NONE      /LPF


 


Urine Mucus NEGATIVE      /LPF


 


Urine Culture Indicated NO      


 


Urine Opiates Screen NEGATIVE     NEGATIVE  


 


Urine Oxycodone Screen NEGATIVE     NEGATIVE  


 


Urine Methadone Screen NEGATIVE     NEGATIVE  


 


Urine Propoxyphene Screen NEGATIVE     NEGATIVE  


 


Urine Barbiturates Screen NEGATIVE     NEGATIVE  


 


Ur Tricyclic Antidepressants


Screen NEGATIVE 


 


 


 


 NEGATIVE  





 


Urine Phencyclidine Screen NEGATIVE     NEGATIVE  


 


Urine Amphetamines Screen POSITIVE H    NEGATIVE  


 


Urine Methamphetamines Screen POSITIVE H    NEGATIVE  


 


Urine Benzodiazepines Screen NEGATIVE     NEGATIVE  


 


Urine Cocaine Screen NEGATIVE     NEGATIVE  


 


Urine Cannabinoids Screen NEGATIVE     NEGATIVE  








Micro Results





Microbiology


7/1/23 Blood Culture - Preliminary, Resulted


         No growth





My Orders





Orders - JONI HURD


Cbc With Automated Diff (7/1/23 16:12)


Comprehensive Metabolic Panel (7/1/23 16:12)


Lipase (7/1/23 16:12)


Creatine Kinase (7/1/23 16:12)


Ua Culture If Indicated (7/1/23 16:12)


Drug Screen Stat (Urine) (7/1/23 16:12)


Ns Iv 1000 Ml (Sodium Chloride 0.9%) (7/1/23 16:12)


Lorazepam Injection (Ativan Injection) (7/1/23 16:15)


Accucheck Stat ONCE (7/1/23 16:12)


Magnesium (7/1/23 16:12)


Covid 19 Inhouse Test (7/1/23 16:12)


Influenza A And B By Pcr (7/1/23 16:12)


Manual Differential (7/1/23 16:41)


Blood Culture (7/1/23 17:04)


Lactic Acid Analyzer (7/1/23 17:04)


Hs C Reactive Protein (7/1/23 17:04)


Foot, Right, 3 View (7/1/23 17:04)


Piperacillin Sodium/Tazobactam (Zosyn Vi (7/1/23 17:15)


Vancomycin Injection (Vancomycin Injecti (7/1/23 17:15)


Ns Iv 1000 Ml (Sodium Chloride 0.9%) (7/1/23 18:08)





Medications Given in ED





Vital Signs/I&O











 7/1/23 7/1/23





 16:04 19:52


 


Temp 37.4 


 


Pulse 88 110


 


Resp 20 24


 


B/P (MAP) 182/91 (121) 169/101


 


Pulse Ox 99 99


 


O2 Delivery Room Air 














 7/2/23





 00:00


 


Intake Total 2350 ml


 


Balance 2350 ml





Capillary Refill : Less Than 3 Seconds





Departure


Communication (PCP)


Reviewed previous ER visits, H&P, lab testing.  Type II diabetic.  Not insulin-

dependent.  Patient reports shaking and not feeling well since waking up this 

afternoon.  Patient with tremoring.  Patient appears to be under the influence 

of substance.  He is afebrile but tachycardic.  Multiple scars to his upper 

extremity.  Reports a chronic wound to his right foot with no increasing pain or

redness or swelling to this area.  Denies any drug use or alcohol use.  States 

he has been working outside for the past few days and concern he may be 

dehydrated.  Sepsis protocol was initiated due to the tachycardia and the wound.

 CBC, CMP, CPK, blood cultures lactic acid.  CBC showed elevated white blood 

count 19.8.  Patient had a sodium of 134.  BUN of 22, creatinine 1.33.  Acute 

kidney insufficiency.  normal CPK liver enzymes.  Patient received 2 L of fluid.

 Lactic acid normal.  Slight elevated CRP 1.68.  X-ray of the right foot 

negative for cortical bone destruction. chronic foot ulcer.  Patient was given 

Zosyn and vancomycin due to the wound as a potential source of infection 

secondary to elevated white blood count.  After reviewing patient's lab work he 

has a history of elevated white blood count since January 2019.  Patient 

received a dose of Ativan for his anxiety and jitteriness.  Initially refused 

urinalysis but did produce a urine which was negative for infection but positive

for methamphetamine.  Concern for methamphetamine component.  White blood count 

may be elevated to the foot ulcer.  Not overly concerning for osteomyelitis but 

does need to be evaluated.  Patient denies of any thoracic or lumbar back pain 

at this time.  He did mention he has had a history of back pain but no back pain

today.  Evaluated his thoracic and lumbar spine without any midline tenderness. 

Denies of any bowel or urine incontinence or saddle paresthesia.  Denies weight 

loss, night sweats or fevers.  Denies chest pain or shortness of breath.  Denies

chest pain short of breath or cough.





Patient was feeling much better.  Patient was eager to leave.  Did not want any 

further testing.  Tremoring did stop.  Remained afebrile.  He does not appear 

toxic but concern for elevated white blood count and this ulcer.  States he does

follow a podiatrist and has had a history wound debridement.  No significant 

surrounding redness or swelling.  will discharge with Keflex and Bactrim 

secondary to the wound.  Recommend podiatry outpatient follow-up for further 

evaluation.  No evidence of rhabdomyolysis.  Neuro exam appropriate  COVID 

influenza was negative.  Recommend outpatient follow-up PCP in 2 to 3 days for 

reevaluation.  Podiatry outpatient follow-up of this wound.  Discussed wound 

care.





Impression





   Primary Impression:  


   Diabetic foot ulcer


Disposition:  01 HOME, SELF-CARE


Condition:  Stable





Departure-Patient Inst.


Decision time for Depature:  18:31


Referrals:  


[facility] [place]


Primary Care Physician








MELINDA LOPEZ DPM


Patient Instructions:  Diabetic Foot Ulcer (DC)





Add. Discharge Instructions:  


Recommend following up with podiatry for evaluation of foot ulcer.  Keep the 

area clean.  Take antibiotic as prescribed





All discharge instructions reviewed with patient and/or family. Voiced 

understanding.


Scripts


Sulfamethoxazole/Trimethoprim (Bactrim Ds Tablet) 800 Mg-160 Mg Tablet


1 EACH PO BID for 7 Days, #14 TAB


   Prov: JONI HURD         7/1/23 


Cephalexin (Cephalexin) 500 Mg Tablet


500 MG PO QID for 7 Days, #28 TAB


   Prov: JONI HURD         7/1/23











JONI HURD           Jul 1, 2023 16:17